# Patient Record
Sex: FEMALE | Race: WHITE | Employment: OTHER | URBAN - NONMETROPOLITAN AREA
[De-identification: names, ages, dates, MRNs, and addresses within clinical notes are randomized per-mention and may not be internally consistent; named-entity substitution may affect disease eponyms.]

---

## 2017-05-02 LAB
AVERAGE GLUCOSE: NORMAL
HBA1C MFR BLD: 7 %
TSH SERPL DL<=0.05 MIU/L-ACNC: 8.28 UIU/ML

## 2017-06-26 LAB — TSH SERPL DL<=0.05 MIU/L-ACNC: 0.08 UIU/ML

## 2018-03-23 LAB
AVERAGE GLUCOSE: NORMAL
HBA1C MFR BLD: 7.1 %

## 2018-05-24 LAB
AVERAGE GLUCOSE: NORMAL
HBA1C MFR BLD: 6.9 %

## 2018-11-24 LAB
ALBUMIN SERPL-MCNC: ABNORMAL G/DL
ALP BLD-CCNC: ABNORMAL U/L
ALT SERPL-CCNC: ABNORMAL U/L
ANION GAP SERPL CALCULATED.3IONS-SCNC: 12 MMOL/L
AST SERPL-CCNC: ABNORMAL U/L
BASOPHILS ABSOLUTE: 0 /ΜL
BASOPHILS RELATIVE PERCENT: 0.5 %
BILIRUB SERPL-MCNC: ABNORMAL MG/DL (ref 0.1–1.4)
BUN BLDV-MCNC: 17 MG/DL
CALCIUM SERPL-MCNC: 8.8 MG/DL
CHLORIDE BLD-SCNC: 104 MMOL/L
CO2: 24 MMOL/L
CREAT SERPL-MCNC: 0.67 MG/DL
EOSINOPHILS ABSOLUTE: 0.5 /ΜL
EOSINOPHILS RELATIVE PERCENT: 7.2 %
GFR CALCULATED: >60
GLUCOSE BLD-MCNC: 147 MG/DL
HCT VFR BLD CALC: 40 % (ref 36–46)
HEMOGLOBIN: 13.6 G/DL (ref 12–16)
LYMPHOCYTES ABSOLUTE: 2.6 /ΜL
LYMPHOCYTES RELATIVE PERCENT: 35.1 %
MCH RBC QN AUTO: 32 PG
MCHC RBC AUTO-ENTMCNC: 33.9 G/DL
MCV RBC AUTO: 94.4 FL
MONOCYTES ABSOLUTE: 0.5 /ΜL
MONOCYTES RELATIVE PERCENT: 7.2 %
NEUTROPHILS ABSOLUTE: 3.7 /ΜL
NEUTROPHILS RELATIVE PERCENT: 50 %
PDW BLD-RTO: 12.6 %
PLATELET # BLD: 238 K/ΜL
PMV BLD AUTO: 8.1 FL
POTASSIUM SERPL-SCNC: 3.7 MMOL/L
RBC # BLD: 4.24 10^6/ΜL
SODIUM BLD-SCNC: 140 MMOL/L
TOTAL PROTEIN: ABNORMAL
WBC # BLD: 7.4 10^3/ML

## 2019-01-07 ENCOUNTER — OFFICE VISIT (OUTPATIENT)
Dept: FAMILY MEDICINE CLINIC | Age: 84
End: 2019-01-07
Payer: COMMERCIAL

## 2019-01-07 VITALS
DIASTOLIC BLOOD PRESSURE: 60 MMHG | TEMPERATURE: 98.4 F | OXYGEN SATURATION: 96 % | HEART RATE: 87 BPM | RESPIRATION RATE: 16 BRPM | SYSTOLIC BLOOD PRESSURE: 136 MMHG | WEIGHT: 135 LBS

## 2019-01-07 DIAGNOSIS — Z91.81 AT HIGH RISK FOR FALLS: Primary | ICD-10-CM

## 2019-01-07 DIAGNOSIS — S03.40XA SPRAIN OF TEMPOROMANDIBULAR JOINT, INITIAL ENCOUNTER: ICD-10-CM

## 2019-01-07 PROCEDURE — 99203 OFFICE O/P NEW LOW 30 MIN: CPT | Performed by: FAMILY MEDICINE

## 2019-01-07 RX ORDER — LOSARTAN POTASSIUM 25 MG/1
TABLET ORAL
COMMUNITY
End: 2019-02-11 | Stop reason: SDUPTHER

## 2019-01-07 RX ORDER — PAROXETINE HYDROCHLORIDE 40 MG/1
TABLET, FILM COATED ORAL
COMMUNITY
Start: 2018-10-06 | End: 2019-01-31 | Stop reason: SDUPTHER

## 2019-01-07 RX ORDER — LEVOTHYROXINE SODIUM 137 UG/1
137 TABLET ORAL DAILY
COMMUNITY
End: 2019-02-11 | Stop reason: SDUPTHER

## 2019-01-07 ASSESSMENT — PATIENT HEALTH QUESTIONNAIRE - PHQ9
2. FEELING DOWN, DEPRESSED OR HOPELESS: 0
SUM OF ALL RESPONSES TO PHQ9 QUESTIONS 1 & 2: 0
SUM OF ALL RESPONSES TO PHQ QUESTIONS 1-9: 0
1. LITTLE INTEREST OR PLEASURE IN DOING THINGS: 0
SUM OF ALL RESPONSES TO PHQ QUESTIONS 1-9: 0

## 2019-01-07 ASSESSMENT — ENCOUNTER SYMPTOMS
RHINORRHEA: 0
ABDOMINAL PAIN: 0
COUGH: 1
DIARRHEA: 0
VOMITING: 0
SORE THROAT: 0

## 2019-01-30 ENCOUNTER — TELEPHONE (OUTPATIENT)
Dept: FAMILY MEDICINE CLINIC | Age: 84
End: 2019-01-30

## 2019-01-30 DIAGNOSIS — F32.A DEPRESSION, UNSPECIFIED DEPRESSION TYPE: Primary | ICD-10-CM

## 2019-01-31 RX ORDER — PAROXETINE HYDROCHLORIDE 40 MG/1
40 TABLET, FILM COATED ORAL EVERY MORNING
Qty: 30 TABLET | Refills: 0 | Status: CANCELLED | OUTPATIENT
Start: 2019-01-31

## 2019-01-31 RX ORDER — PAROXETINE HYDROCHLORIDE 40 MG/1
40 TABLET, FILM COATED ORAL EVERY MORNING
Qty: 30 TABLET | Refills: 0 | Status: SHIPPED | OUTPATIENT
Start: 2019-01-31 | End: 2019-02-11 | Stop reason: SDUPTHER

## 2019-02-11 ENCOUNTER — OFFICE VISIT (OUTPATIENT)
Dept: FAMILY MEDICINE CLINIC | Age: 84
End: 2019-02-11
Payer: COMMERCIAL

## 2019-02-11 VITALS
RESPIRATION RATE: 18 BRPM | HEART RATE: 64 BPM | BODY MASS INDEX: 24.45 KG/M2 | HEIGHT: 63 IN | OXYGEN SATURATION: 96 % | SYSTOLIC BLOOD PRESSURE: 110 MMHG | DIASTOLIC BLOOD PRESSURE: 62 MMHG | WEIGHT: 138 LBS

## 2019-02-11 DIAGNOSIS — N39.3 STRESS INCONTINENCE: ICD-10-CM

## 2019-02-11 DIAGNOSIS — H91.90 HEARING LOSS, UNSPECIFIED HEARING LOSS TYPE, UNSPECIFIED LATERALITY: ICD-10-CM

## 2019-02-11 DIAGNOSIS — K58.9 IRRITABLE BOWEL SYNDROME, UNSPECIFIED TYPE: ICD-10-CM

## 2019-02-11 DIAGNOSIS — R30.0 DYSURIA: Primary | ICD-10-CM

## 2019-02-11 DIAGNOSIS — E11.9 TYPE 2 DIABETES MELLITUS WITHOUT COMPLICATION, WITHOUT LONG-TERM CURRENT USE OF INSULIN (HCC): ICD-10-CM

## 2019-02-11 DIAGNOSIS — J45.20 MILD INTERMITTENT ASTHMA WITHOUT COMPLICATION: ICD-10-CM

## 2019-02-11 DIAGNOSIS — F32.A DEPRESSION, UNSPECIFIED DEPRESSION TYPE: ICD-10-CM

## 2019-02-11 DIAGNOSIS — E03.9 HYPOTHYROIDISM, UNSPECIFIED TYPE: ICD-10-CM

## 2019-02-11 DIAGNOSIS — N39.0 FREQUENT UTI: ICD-10-CM

## 2019-02-11 DIAGNOSIS — F41.9 ANXIETY: ICD-10-CM

## 2019-02-11 DIAGNOSIS — R29.6 RECURRENT FALLS: ICD-10-CM

## 2019-02-11 DIAGNOSIS — I10 ESSENTIAL HYPERTENSION: ICD-10-CM

## 2019-02-11 LAB
BILIRUBIN, POC: NEGATIVE
BLOOD URINE, POC: NORMAL
CLARITY, POC: NORMAL
COLOR, POC: YELLOW
CREATININE URINE POCT: 100
GLUCOSE URINE, POC: NEGATIVE
KETONES, POC: NEGATIVE
LEUKOCYTE EST, POC: NORMAL
MICROALBUMIN/CREAT 24H UR: 30 MG/G{CREAT}
MICROALBUMIN/CREAT UR-RTO: <30
NITRITE, POC: POSITIVE
PH, POC: 6.5
PROTEIN, POC: NEGATIVE
SPECIFIC GRAVITY, POC: 1.01
UROBILINOGEN, POC: 0.2

## 2019-02-11 PROCEDURE — 99214 OFFICE O/P EST MOD 30 MIN: CPT | Performed by: FAMILY MEDICINE

## 2019-02-11 PROCEDURE — 81003 URINALYSIS AUTO W/O SCOPE: CPT | Performed by: FAMILY MEDICINE

## 2019-02-11 PROCEDURE — 82044 UR ALBUMIN SEMIQUANTITATIVE: CPT | Performed by: FAMILY MEDICINE

## 2019-02-11 RX ORDER — ALBUTEROL SULFATE 90 UG/1
2 AEROSOL, METERED RESPIRATORY (INHALATION) EVERY 6 HOURS PRN
COMMUNITY
End: 2019-02-11 | Stop reason: SDUPTHER

## 2019-02-11 RX ORDER — PAROXETINE HYDROCHLORIDE 40 MG/1
40 TABLET, FILM COATED ORAL EVERY MORNING
Qty: 90 TABLET | Refills: 3 | Status: SHIPPED | OUTPATIENT
Start: 2019-02-11 | End: 2019-04-04 | Stop reason: SDUPTHER

## 2019-02-11 RX ORDER — CEPHALEXIN 500 MG/1
500 CAPSULE ORAL 2 TIMES DAILY
Qty: 14 CAPSULE | Refills: 0 | Status: SHIPPED | OUTPATIENT
Start: 2019-02-11 | End: 2019-05-02

## 2019-02-11 RX ORDER — LOSARTAN POTASSIUM 25 MG/1
12.5 TABLET ORAL DAILY
Qty: 45 TABLET | Refills: 3 | Status: SHIPPED | OUTPATIENT
Start: 2019-02-11 | End: 2019-05-02 | Stop reason: SDUPTHER

## 2019-02-11 RX ORDER — ALBUTEROL SULFATE 90 UG/1
2 AEROSOL, METERED RESPIRATORY (INHALATION) EVERY 6 HOURS PRN
Qty: 1 INHALER | Refills: 3 | Status: SHIPPED | OUTPATIENT
Start: 2019-02-11 | End: 2020-02-27 | Stop reason: SDUPTHER

## 2019-02-11 RX ORDER — LEVOTHYROXINE SODIUM 137 UG/1
137 TABLET ORAL DAILY
Qty: 90 TABLET | Refills: 1 | Status: SHIPPED | OUTPATIENT
Start: 2019-02-11 | End: 2019-07-18 | Stop reason: SDUPTHER

## 2019-02-11 ASSESSMENT — ENCOUNTER SYMPTOMS
COUGH: 1
SHORTNESS OF BREATH: 0
EYE DISCHARGE: 0
VOMITING: 0
CONSTIPATION: 0
BACK PAIN: 0
DIARRHEA: 0
RHINORRHEA: 0
NAUSEA: 0
ABDOMINAL PAIN: 0
BLOOD IN STOOL: 0
EYE REDNESS: 0
SORE THROAT: 0

## 2019-02-13 LAB
ORGANISM: ABNORMAL
URINE CULTURE, ROUTINE: ABNORMAL

## 2019-02-22 DIAGNOSIS — F32.A DEPRESSION, UNSPECIFIED DEPRESSION TYPE: ICD-10-CM

## 2019-02-22 RX ORDER — PAROXETINE HYDROCHLORIDE 40 MG/1
40 TABLET, FILM COATED ORAL EVERY MORNING
Qty: 90 TABLET | Refills: 3 | Status: CANCELLED | OUTPATIENT
Start: 2019-02-22

## 2019-02-25 DIAGNOSIS — F32.A DEPRESSION, UNSPECIFIED DEPRESSION TYPE: ICD-10-CM

## 2019-02-26 RX ORDER — PAROXETINE HYDROCHLORIDE 40 MG/1
40 TABLET, FILM COATED ORAL EVERY MORNING
Qty: 90 TABLET | Refills: 3 | OUTPATIENT
Start: 2019-02-26

## 2019-02-27 DIAGNOSIS — F32.A DEPRESSION, UNSPECIFIED DEPRESSION TYPE: ICD-10-CM

## 2019-02-27 RX ORDER — PAROXETINE HYDROCHLORIDE 40 MG/1
TABLET, FILM COATED ORAL
Qty: 30 TABLET | Refills: 0 | OUTPATIENT
Start: 2019-02-27

## 2019-03-01 ENCOUNTER — TELEPHONE (OUTPATIENT)
Dept: FAMILY MEDICINE CLINIC | Age: 84
End: 2019-03-01

## 2019-03-06 DIAGNOSIS — F32.A DEPRESSION, UNSPECIFIED DEPRESSION TYPE: ICD-10-CM

## 2019-03-07 RX ORDER — PAROXETINE HYDROCHLORIDE 40 MG/1
40 TABLET, FILM COATED ORAL EVERY MORNING
Qty: 90 TABLET | Refills: 3 | OUTPATIENT
Start: 2019-03-07

## 2019-03-08 DIAGNOSIS — F32.A DEPRESSION, UNSPECIFIED DEPRESSION TYPE: ICD-10-CM

## 2019-03-08 RX ORDER — PAROXETINE HYDROCHLORIDE 40 MG/1
40 TABLET, FILM COATED ORAL EVERY MORNING
Qty: 90 TABLET | Refills: 3 | OUTPATIENT
Start: 2019-03-08

## 2019-03-27 ENCOUNTER — TELEPHONE (OUTPATIENT)
Dept: FAMILY MEDICINE CLINIC | Age: 84
End: 2019-03-27

## 2019-04-03 DIAGNOSIS — F32.A DEPRESSION, UNSPECIFIED DEPRESSION TYPE: ICD-10-CM

## 2019-04-04 RX ORDER — PAROXETINE HYDROCHLORIDE 40 MG/1
40 TABLET, FILM COATED ORAL EVERY MORNING
Qty: 90 TABLET | Refills: 3 | Status: SHIPPED | OUTPATIENT
Start: 2019-04-04 | End: 2019-11-07 | Stop reason: SDUPTHER

## 2019-04-04 NOTE — TELEPHONE ENCOUNTER
Rx was already sent in to the pharmacy 2/11 for 90 day supply with refills. Please confirm that they have script and that new script is not needed.

## 2019-04-05 NOTE — TELEPHONE ENCOUNTER
Spoke with Melita Mccullough at [facility]. They do not need another prescription. They had the patient in their system twice. Patient does not need a refill.

## 2019-04-16 DIAGNOSIS — F32.A DEPRESSION, UNSPECIFIED DEPRESSION TYPE: ICD-10-CM

## 2019-04-22 RX ORDER — PAROXETINE HYDROCHLORIDE 40 MG/1
40 TABLET, FILM COATED ORAL EVERY MORNING
Qty: 90 TABLET | Refills: 3 | OUTPATIENT
Start: 2019-04-22

## 2019-05-02 ENCOUNTER — TELEPHONE (OUTPATIENT)
Dept: FAMILY MEDICINE CLINIC | Age: 84
End: 2019-05-02

## 2019-05-02 ENCOUNTER — OFFICE VISIT (OUTPATIENT)
Dept: FAMILY MEDICINE CLINIC | Age: 84
End: 2019-05-02
Payer: COMMERCIAL

## 2019-05-02 VITALS
RESPIRATION RATE: 16 BRPM | HEIGHT: 63 IN | SYSTOLIC BLOOD PRESSURE: 110 MMHG | OXYGEN SATURATION: 98 % | BODY MASS INDEX: 24.41 KG/M2 | DIASTOLIC BLOOD PRESSURE: 70 MMHG | HEART RATE: 77 BPM | WEIGHT: 137.8 LBS

## 2019-05-02 DIAGNOSIS — K58.9 IRRITABLE BOWEL SYNDROME, UNSPECIFIED TYPE: ICD-10-CM

## 2019-05-02 DIAGNOSIS — F41.9 ANXIETY: ICD-10-CM

## 2019-05-02 DIAGNOSIS — E11.9 TYPE 2 DIABETES MELLITUS WITHOUT COMPLICATION, WITHOUT LONG-TERM CURRENT USE OF INSULIN (HCC): Primary | ICD-10-CM

## 2019-05-02 DIAGNOSIS — F32.A DEPRESSION, UNSPECIFIED DEPRESSION TYPE: ICD-10-CM

## 2019-05-02 DIAGNOSIS — J45.20 MILD INTERMITTENT ASTHMA WITHOUT COMPLICATION: ICD-10-CM

## 2019-05-02 DIAGNOSIS — N39.3 STRESS INCONTINENCE: ICD-10-CM

## 2019-05-02 DIAGNOSIS — R05.9 COUGH: ICD-10-CM

## 2019-05-02 DIAGNOSIS — E03.9 HYPOTHYROIDISM, UNSPECIFIED TYPE: ICD-10-CM

## 2019-05-02 DIAGNOSIS — R29.6 RECURRENT FALLS: ICD-10-CM

## 2019-05-02 PROBLEM — I10 ESSENTIAL HYPERTENSION: Status: RESOLVED | Noted: 2019-02-11 | Resolved: 2019-05-02

## 2019-05-02 LAB — HBA1C MFR BLD: 6.8 %

## 2019-05-02 PROCEDURE — 99214 OFFICE O/P EST MOD 30 MIN: CPT | Performed by: FAMILY MEDICINE

## 2019-05-02 PROCEDURE — 83036 HEMOGLOBIN GLYCOSYLATED A1C: CPT | Performed by: FAMILY MEDICINE

## 2019-05-02 RX ORDER — LOSARTAN POTASSIUM 25 MG/1
12.5 TABLET ORAL DAILY
Qty: 45 TABLET | Refills: 3 | Status: SHIPPED | OUTPATIENT
Start: 2019-05-02 | End: 2019-05-02

## 2019-05-02 ASSESSMENT — ENCOUNTER SYMPTOMS
EYE DISCHARGE: 0
DIARRHEA: 0
CONSTIPATION: 0
ABDOMINAL PAIN: 0
RHINORRHEA: 0
VOMITING: 0
BLOOD IN STOOL: 0
NAUSEA: 0
COUGH: 1
EYE REDNESS: 0
SHORTNESS OF BREATH: 0
SORE THROAT: 0
BACK PAIN: 0

## 2019-05-02 NOTE — PROGRESS NOTES
[address] [address] [place] [address]  Dept: [phone]  Dept Fax: [phone]  Loc: [phone]      Tae Mckeon is a 80 y.o. female who presents todayfor her medical conditions/complaints as noted below. Tae Mckeon is c/o of Discuss Medications (losartan recall? )      HPI:     HPI     Here with questions about medication. Got a letter about it in the mail suggesting she speak with doctor. Feeling well. No history of hypertension. Losartan was started for renal protection with diabetes. Feels well. No side effects noted. Finished medication for UTI. These symptoms are gone. Daughter is Lou Valera. IBS- well managed and eats many small meals throughout the day, regular bowel movements daily. Hypothyroid- no issues with medication, weight stable. T2DM- has been 10 years since she's taken any medication for glucose control, used to take metformin, was diagnosed 25 years ago. Reports A1C in December is at goal.      Asthma- might use albuterol inhaler 1 to 2 times a month depending on when she visits one of her daughters due to allergies to cat    Anxiety- well- controlled on paxil for 25 years, mood well, no SI/HI/SH    Recurrent UTI- hx of mesh placement for stress incontinence, procedure was 15 years ago; doing well without incontinence. No new falls. Hearing not too great and hearing aids not working too well- seeing Khang Aguilera in [facility]     Reports up to date on vaccinations. Las labs were done in November or December; requesting records. Patient Active Problem List   Diagnosis    Irritable bowel    Hypothyroid    Frequent UTI    Diabetes ([facility] [address]    Asthma    Anxiety    Stress incontinence    Recurrent falls    Hearing loss       The patient is allergic to pcn [penicillins] and sulfa antibiotics.     Past Medical History  Annetta Hernandez has a past medical history of Anxiety, Asthma, Diabetes ([address] Frequent UTI, Hearing loss, Hypothyroid, Irritable bowel, Recurrent falls, Stress incontinence, and Tarsal coalitions. Past SurgicalHistory  The patient  has a past surgical history that includes Hysterectomy, total abdominal; Gallbladder surgery; Hand surgery; Inner ear surgery; Tonsillectomy and adenoidectomy; Cataract removal; and Urethra surgery. Family History  This patient's family history includes Atrial Fibrillation in her mother; Breast Cancer in her mother. Social History  Stefanie  reports that she has never smoked. She has never used smokeless tobacco.    Medications    Current Outpatient Medications:     PARoxetine (PAXIL) 40 MG tablet, Take 1 tablet by mouth every morning, Disp: 90 tablet, Rfl: 3    Multiple Vitamins-Minerals (EYE VITAMINS) CAPS, Take by mouth, Disp: , Rfl:     Multiple Vitamins-Minerals (WOMENS MULTIVITAMIN) TABS, Take by mouth, Disp: , Rfl:     Calcium Carb-Cholecalciferol (CALCIUM/VITAMIN D) 600-400 MG-UNIT TABS, Take by mouth, Disp: , Rfl:     Cranberry 1000 MG CAPS, Take by mouth, Disp: , Rfl:     albuterol sulfate HFA (PROAIR HFA) 108 (90 Base) MCG/ACT inhaler, Inhale 2 puffs into the lungs every 6 hours as needed for Wheezing, Disp: 1 Inhaler, Rfl: 3    levothyroxine (SYNTHROID) 137 MCG tablet, Take 1 tablet by mouth Daily, Disp: 90 tablet, Rfl: 1    Subjective:      Review of Systems   Constitutional: Negative for chills, diaphoresis, fatigue, fever and unexpected weight change. HENT: Positive for hearing loss. Negative for congestion, ear discharge, ear pain, rhinorrhea and sore throat. Eyes: Negative for discharge, redness and visual disturbance. Respiratory: Positive for cough (occasional dry cough worse with swallowing; denies trouble swallowing). Negative for shortness of breath. Cardiovascular: Negative for chest pain and palpitations.    Gastrointestinal: Negative for abdominal pain, blood in stool, constipation, diarrhea, nausea negative  Spider veins. No sign of thrombophlebitis. Sensory exam:  Monofilament sensation: normal  (minimum of 5 random plantar locations tested, avoiding callused areas - > 1 area with absence of sensation is + for neuropathy)    Plus at least one of the following:  Pulses: normal,   Pinprick: N/A  Proprioception: N/A  Vibration (128 Hz): N/A      No results found for: WBC, HGB, HCT, PLT, CHOL, TRIG, HDL, LDLDIRECT, ALT, AST, NA, K, CL, CREATININE, BUN, CO2, TSH, PSA, INR, GLUF, LABA1C, LABMICR    Assessment/Plan:   1. Type 2 diabetes mellitus without complication, without long-term current use of insulin (HCC)  At goal for age. Will D/C losartan since no microalbuminuria no hypertension.   - POCT glycosylated hemoglobin (Hb A1C)  -  DIABETES FOOT EXAM    2. Cough  May be related to losartan; will trial D/C. Lungs clear. 3. Hypothyroidism, unspecified type  Requesting record of labs. Continue current regimen. 4. Irritable bowel syndrome, unspecified type  Stable. 5. Mild intermittent asthma without complication  Well controlled. 6. Anxiety  Well controlled. 7. Stress incontinence  Stable. 8. Recurrent falls  None further. Will follow. 9. Depression, unspecified depression type  Well controlled. Return in about 3 months (around 8/2/2019) for follow-up diabetes . Orders Placed   Orders Placed This Encounter   Procedures    POCT glycosylated hemoglobin (Hb A1C)     DIABETES FOOT EXAM       Prescriptions given/sent  Orders Placed This Encounter   Medications    DISCONTD: losartan (COZAAR) 25 MG tablet     Sig: Take 0.5 tablets by mouth daily Take 1/2 tablet by mouth once daily     Dispense:  45 tablet     Refill:  3       Patient given educational materials - see patient instructions. Discussed use, benefit, and side effects of prescribed medications. All patientquestions answered. Pt voiced understanding. Reviewed health maintenance.         Electronically signed by Saeid Quintana MD on 5/2/2019 at 9:16 AM

## 2019-05-08 NOTE — TELEPHONE ENCOUNTER
Dr. Latesha Jacobson office is faxing the last labs to us looks like they are from 11/24/18.
Emily Rothman where is  located at??
Pharmacy notified.
Declines

## 2019-05-22 ENCOUNTER — TELEPHONE (OUTPATIENT)
Dept: FAMILY MEDICINE CLINIC | Age: 84
End: 2019-05-22

## 2019-05-22 NOTE — TELEPHONE ENCOUNTER
Tae Mckeon called requesting a refill on the following medications:  Requested Prescriptions     Pending Prescriptions Disp Refills    levothyroxine (SYNTHROID) 137 MCG tablet 90 tablet 1     Sig: Take 1 tablet by mouth Daily     Pharmacy verified:  [facility]  . pv      Date of last visit: 5/2/2019  Date of next visit (if applicable): 5/37/6592

## 2019-05-23 RX ORDER — LEVOTHYROXINE SODIUM 137 UG/1
137 TABLET ORAL DAILY
Qty: 90 TABLET | Refills: 1 | Status: CANCELLED | OUTPATIENT
Start: 2019-05-23

## 2019-05-24 NOTE — TELEPHONE ENCOUNTER
Pharmacy does not need a refill for this medication. It is ready for that patient to . Inge De Jesus at the pharmacy deleted the second profile. She said it looks like it only had one prescription that was filled in January. Patient now only has one profile at pharmacy.

## 2019-07-18 ENCOUNTER — OFFICE VISIT (OUTPATIENT)
Dept: FAMILY MEDICINE CLINIC | Age: 84
End: 2019-07-18
Payer: COMMERCIAL

## 2019-07-18 VITALS
HEIGHT: 63 IN | BODY MASS INDEX: 23.88 KG/M2 | DIASTOLIC BLOOD PRESSURE: 86 MMHG | HEART RATE: 73 BPM | WEIGHT: 134.8 LBS | RESPIRATION RATE: 16 BRPM | SYSTOLIC BLOOD PRESSURE: 132 MMHG | OXYGEN SATURATION: 96 %

## 2019-07-18 DIAGNOSIS — Z00.00 ROUTINE GENERAL MEDICAL EXAMINATION AT A HEALTH CARE FACILITY: Primary | ICD-10-CM

## 2019-07-18 DIAGNOSIS — N39.3 STRESS INCONTINENCE: ICD-10-CM

## 2019-07-18 DIAGNOSIS — J45.20 MILD INTERMITTENT ASTHMA WITHOUT COMPLICATION: ICD-10-CM

## 2019-07-18 DIAGNOSIS — Z71.89 ACP (ADVANCE CARE PLANNING): ICD-10-CM

## 2019-07-18 DIAGNOSIS — E03.9 HYPOTHYROIDISM, UNSPECIFIED TYPE: ICD-10-CM

## 2019-07-18 DIAGNOSIS — K58.9 IRRITABLE BOWEL SYNDROME, UNSPECIFIED TYPE: ICD-10-CM

## 2019-07-18 DIAGNOSIS — H91.90 HEARING LOSS, UNSPECIFIED HEARING LOSS TYPE, UNSPECIFIED LATERALITY: ICD-10-CM

## 2019-07-18 DIAGNOSIS — F32.A DEPRESSION, UNSPECIFIED DEPRESSION TYPE: ICD-10-CM

## 2019-07-18 DIAGNOSIS — I10 ESSENTIAL HYPERTENSION: ICD-10-CM

## 2019-07-18 DIAGNOSIS — F41.9 ANXIETY: ICD-10-CM

## 2019-07-18 DIAGNOSIS — E11.9 TYPE 2 DIABETES MELLITUS WITHOUT COMPLICATION, WITHOUT LONG-TERM CURRENT USE OF INSULIN (HCC): ICD-10-CM

## 2019-07-18 PROCEDURE — G0439 PPPS, SUBSEQ VISIT: HCPCS | Performed by: FAMILY MEDICINE

## 2019-07-18 PROCEDURE — 99497 ADVNCD CARE PLAN 30 MIN: CPT | Performed by: FAMILY MEDICINE

## 2019-07-18 RX ORDER — LEVOTHYROXINE SODIUM 137 UG/1
137 TABLET ORAL DAILY
Qty: 90 TABLET | Refills: 1 | Status: SHIPPED | OUTPATIENT
Start: 2019-07-18 | End: 2019-11-07 | Stop reason: SDUPTHER

## 2019-07-18 ASSESSMENT — ENCOUNTER SYMPTOMS
CONSTIPATION: 0
SORE THROAT: 0
EYE REDNESS: 0
ABDOMINAL PAIN: 0
VOMITING: 0
BACK PAIN: 0
EYE DISCHARGE: 0
DIARRHEA: 0
RHINORRHEA: 0
BLOOD IN STOOL: 0
NAUSEA: 0
SHORTNESS OF BREATH: 0
COUGH: 1

## 2019-07-18 ASSESSMENT — LIFESTYLE VARIABLES
HOW OFTEN DURING THE LAST YEAR HAVE YOU FOUND THAT YOU WERE NOT ABLE TO STOP DRINKING ONCE YOU HAD STARTED: 0
HOW MANY STANDARD DRINKS CONTAINING ALCOHOL DO YOU HAVE ON A TYPICAL DAY: 0
HAVE YOU OR SOMEONE ELSE BEEN INJURED AS A RESULT OF YOUR DRINKING: 0
HOW OFTEN DURING THE LAST YEAR HAVE YOU BEEN UNABLE TO REMEMBER WHAT HAPPENED THE NIGHT BEFORE BECAUSE YOU HAD BEEN DRINKING: 0
AUDIT TOTAL SCORE: 1
HOW OFTEN DURING THE LAST YEAR HAVE YOU NEEDED AN ALCOHOLIC DRINK FIRST THING IN THE MORNING TO GET YOURSELF GOING AFTER A NIGHT OF HEAVY DRINKING: 0
HOW OFTEN DO YOU HAVE SIX OR MORE DRINKS ON ONE OCCASION: 0
HOW OFTEN DURING THE LAST YEAR HAVE YOU FAILED TO DO WHAT WAS NORMALLY EXPECTED FROM YOU BECAUSE OF DRINKING: 0
HAS A RELATIVE, FRIEND, DOCTOR, OR ANOTHER HEALTH PROFESSIONAL EXPRESSED CONCERN ABOUT YOUR DRINKING OR SUGGESTED YOU CUT DOWN: 0
HOW OFTEN DURING THE LAST YEAR HAVE YOU HAD A FEELING OF GUILT OR REMORSE AFTER DRINKING: 0
HOW OFTEN DO YOU HAVE A DRINK CONTAINING ALCOHOL: 1
AUDIT-C TOTAL SCORE: 1

## 2019-07-18 ASSESSMENT — PATIENT HEALTH QUESTIONNAIRE - PHQ9
SUM OF ALL RESPONSES TO PHQ QUESTIONS 1-9: 1
SUM OF ALL RESPONSES TO PHQ QUESTIONS 1-9: 1

## 2019-07-18 NOTE — PROGRESS NOTES
40 MG tablet Take 1 tablet by mouth every morning Yes Selam Huntley MD   Multiple Vitamins-Minerals (EYE VITAMINS) CAPS Take by mouth Yes Historical Provider, MD   Multiple Vitamins-Minerals (WOMENS MULTIVITAMIN) TABS Take by mouth Yes Historical Provider, MD   Calcium Carb-Cholecalciferol (CALCIUM/VITAMIN D) 600-400 MG-UNIT TABS Take by mouth Yes Historical Provider, MD   Cranberry 1000 MG CAPS Take by mouth Yes Historical Provider, MD   albuterol sulfate HFA (PROAIR HFA) 108 (90 Base) MCG/ACT inhaler Inhale 2 puffs into the lungs every 6 hours as needed for Wheezing Yes Selam Huntley MD   levothyroxine (SYNTHROID) 137 MCG tablet Take 1 tablet by mouth Daily Yes Selam Huntley MD     Past Medical History:   Diagnosis Date    Anxiety     Asthma     Diabetes ([address]     type 2    Frequent UTI     Hearing loss     follows with Omero Ferrer    Hypothyroid     Irritable bowel     Recurrent falls     Stress incontinence     Tarsal coalitions     bilateral feet     Past Surgical History:   Procedure Laterality Date    CATARACT REMOVAL      bilateral    GALLBLADDER SURGERY      HAND SURGERY      froze left wrist to help with arthritis x 2    HYSTERECTOMY, TOTAL ABDOMINAL      INNER EAR SURGERY      uncertain nature; remote    TONSILLECTOMY AND ADENOIDECTOMY      URETHRA SURGERY      15 years ago, mesh placed for stress incontinence     Family History   Problem Relation Age of Onset    Breast Cancer Mother     Atrial Fibrillation Mother        CareTeam (Including outside providers/suppliers regularly involved in providing care):   Patient Care Team:  Selam Huntley MD as PCP - General (Family Medicine)  Selam Huntley MD as PCP - REHABILITATION [facility] Empaneled Provider    Wt Readings from Last 3 Encounters:   07/18/19 134 lb 12.8 oz (61.1 kg)   05/02/19 137 lb 12.8 oz (62.5 kg)   02/11/19 138 lb (62.6 kg)     Vitals:    07/18/19 0958   BP: 132/86   Site: Left Upper Arm   Pulse: 73   Resp: 16   SpO2: 96%   Weight: Metabolic Panel; Future  - CBC With Auto Differential; Future  - Lipid, Fasting; Future    3. Hypothyroidism, unspecified type  Will check labs. - levothyroxine (SYNTHROID) 137 MCG tablet; Take 1 tablet by mouth Daily  Dispense: 90 tablet; Refill: 1    4. Irritable bowel syndrome, unspecified type  Stable. 5. Mild intermittent asthma without complication  Stable. 6. Anxiety  Stable. 7. Stress incontinence  Stable. 8. Depression, unspecified depression type  Stable. 9. Hearing loss, unspecified hearing loss type, unspecified laterality  Managing okay. 10. Essential hypertension  At goal off medication. Advanced care planning performed today. 10 minute discussion. Vandana Buenrostro would make decisions. Wishes to be DNR. Will sign paperwork when facility sends in. Back pain suspect arthritis; reviewed prior imaging. Patient to trial cushion that was effective in the past.    No new symptoms or red flags. Advance Care Planning   Advanced Care Planning: Discussed the patients choices for care and treatment in case of a health event that adversely affects decision-making abilities. Also discussed the patients long-term treatment options. Reviewed with the patient the [address] [place] [address] Will Declaration forms  Reviewed the process of designating a competent adult as an Agent (or -in-fact) that could take make health care decisions for the patient if incompetent. Patient was asked to complete the declaration forms, either acknowledge the forms by a public notary or an eligible witness and provide a signed copy to the practice office.   Time spent (minutes): 10

## 2019-07-18 NOTE — PATIENT INSTRUCTIONS
off with a towel, or swimming. · Always wear a seat belt when traveling in a car. Always wear a helmet when riding a bicycle or motorcycle.

## 2019-09-05 ENCOUNTER — OFFICE VISIT (OUTPATIENT)
Dept: FAMILY MEDICINE CLINIC | Age: 84
End: 2019-09-05
Payer: COMMERCIAL

## 2019-09-05 VITALS
BODY MASS INDEX: 24.62 KG/M2 | SYSTOLIC BLOOD PRESSURE: 130 MMHG | DIASTOLIC BLOOD PRESSURE: 66 MMHG | HEART RATE: 88 BPM | WEIGHT: 139 LBS | OXYGEN SATURATION: 96 % | RESPIRATION RATE: 18 BRPM

## 2019-09-05 DIAGNOSIS — Q74.2 FOOT ANOMALY, CONGENITAL: ICD-10-CM

## 2019-09-05 DIAGNOSIS — M25.552 LEFT HIP PAIN: ICD-10-CM

## 2019-09-05 DIAGNOSIS — G89.29 CHRONIC LEFT-SIDED LOW BACK PAIN WITH LEFT-SIDED SCIATICA: Primary | ICD-10-CM

## 2019-09-05 DIAGNOSIS — M54.42 CHRONIC LEFT-SIDED LOW BACK PAIN WITH LEFT-SIDED SCIATICA: Primary | ICD-10-CM

## 2019-09-05 DIAGNOSIS — M25.512 CHRONIC LEFT SHOULDER PAIN: ICD-10-CM

## 2019-09-05 DIAGNOSIS — G89.29 CHRONIC LEFT SHOULDER PAIN: ICD-10-CM

## 2019-09-05 PROCEDURE — 99214 OFFICE O/P EST MOD 30 MIN: CPT | Performed by: FAMILY MEDICINE

## 2019-09-05 ASSESSMENT — ENCOUNTER SYMPTOMS
RHINORRHEA: 0
DIARRHEA: 0
VOMITING: 0
EYE DISCHARGE: 0
CONSTIPATION: 0
NAUSEA: 0
SHORTNESS OF BREATH: 0
BLOOD IN STOOL: 0
BACK PAIN: 1
EYE REDNESS: 0
COUGH: 1
SORE THROAT: 0
ABDOMINAL PAIN: 0

## 2019-09-05 NOTE — PATIENT INSTRUCTIONS
breaks from sitting. Get up and walk around, or lie in a comfortable position. · Try using a heating pad on a low or medium setting for 15 to 20 minutes every 2 or 3 hours. Try a warm shower in place of one session with the heating pad. · You can also try an ice pack for 10 to 15 minutes every 2 to 3 hours. Put a thin cloth between the ice pack and your skin. · Take pain medicines exactly as directed. ? If the doctor gave you a prescription medicine for pain, take it as prescribed. ? If you are not taking a prescription pain medicine, ask your doctor if you can take an over-the-counter medicine. · Take short walks several times a day. You can start with 5 to 10 minutes, 3 or 4 times a day, and work up to longer walks. Walk on level surfaces and avoid hills and stairs until your back is better. · Return to work and other activities as soon as you can. Continued rest without activity is usually not good for your back. · To prevent future back pain, do exercises to stretch and strengthen your back and stomach. Learn how to use good posture, safe lifting techniques, and proper body mechanics. When should you call for help? Call your doctor now or seek immediate medical care if:    · You have new or worsening numbness in your legs.     · You have new or worsening weakness in your legs. (This could make it hard to stand up.)     · You lose control of your bladder or bowels.    Watch closely for changes in your health, and be sure to contact your doctor if:    · You have a fever, lose weight, or don't feel well.     · You do not get better as expected. Where can you learn more? Go to https://DataWare VentureswillieCormedics.TELA Bio. org and sign in to your Cater to u account. Enter J630 in the NeuroSky box to learn more about \"Back Pain: Care Instructions. \"     If you do not have an account, please click on the \"Sign Up Now\" link.   Current as of: September 20, 2018  Content Version: 12.1  © 9994-1858 can take an over-the-counter medicine. What else can you do? · Stretch and exercise. Exercises that increase flexibility may relieve your pain and make it easier for your muscles to keep your spine in a good, neutral position. And don't forget to keep walking. · Do self-massage. You can use self-massage to unwind after work or school or to energize yourself in the morning. You can easily massage your feet, hands, or neck. Self-massage works best if you are in comfortable clothes and are sitting or lying in a comfortable position. Use oil or lotion to massage bare skin. · Reduce stress. Back pain can lead to a vicious Lumbee: Distress about the pain tenses the muscles in your back, which in turn causes more pain. Learn how to relax your mind and your muscles to lower your stress. Where can you learn more? Go to https://VenmopeImpermiumeweb.FuturaMedia. org and sign in to your NanoGram account. Enter L698 in the BF [employer]ities box to learn more about \"Learning About Relief for Back Pain. \"     If you do not have an account, please click on the \"Sign Up Now\" link. Current as of: September 20, 2018  Content Version: 12.1  © 0274-1310 [employer]ip[employer]. Care instructions adapted under license by [facility] If you have questions about a medical condition or this instruction, always ask your healthcare professional. [address] [address] any warranty or liability for your use of this information. Patient Education        Back Pain, Emergency or Urgent Symptoms: Care Instructions  Your Care Instructions    Many people have back pain at one time or another. In most cases, pain gets better with self-care that includes over-the-counter pain medicine, ice, heat, and exercises. Unless you have symptoms of a severe injury or heart attack, you may be able to give yourself a few days before you call a doctor. But some back problems are very serious.  Do not ignore symptoms that need to be checked right away. Follow-up care is a key part of your treatment and safety. Be sure to make and go to all appointments, and call your doctor if you are having problems. It's also a good idea to know your test results and keep a list of the medicines you take. How can you care for yourself at home? · Sit or lie in positions that are most comfortable and that reduce your pain. Try one of these positions when you lie down:  ? Lie on your back with your knees bent and supported by large pillows. ? Lie on the floor with your legs on the seat of a sofa or chair. ? Lie on your side with your knees and hips bent and a pillow between your legs. ? Lie on your stomach if it does not make pain worse. · Do not sit up in bed, and avoid soft couches and twisted positions. Bed rest can help relieve pain at first, but it delays healing. Avoid bed rest after the first day. · Change positions every 30 minutes. If you must sit for long periods of time, take breaks from sitting. Get up and walk around, or lie flat. · Try using a heating pad on a low or medium setting, for 15 to 20 minutes every 2 or 3 hours. Try a warm shower in place of one session with the heating pad. You can also buy single-use heat wraps that last up to 8 hours. You can also try ice or cold packs on your back for 10 to 20 minutes at a time, several times a day. (Put a thin cloth between the ice pack and your skin.) This reduces pain and makes it easier to be active and exercise. · Take pain medicines exactly as directed. ? If the doctor gave you a prescription medicine for pain, take it as prescribed. ? If you are not taking a prescription pain medicine, ask your doctor if you can take an over-the-counter medicine. When should you call for help? Call 911 anytime you think you may need emergency care.  For example, call if:    · You are unable to move a leg at all.     · You have back pain with severe belly pain.     · You have symptoms of a heart

## 2019-09-05 NOTE — PROGRESS NOTES
of motion and stable joint. No effusion. Left shoulder is quite tender focally at the [facility] joint. Patient has full range of motion without weakness. No effusion. Feet are flat with chronic swelling and very limited range of motion of ankles. Neurovascularly intact. Lymphadenopathy:     She has no cervical adenopathy. Neurological: She is alert. No obvious focal deficit. Skin: Skin is warm and dry. No rash noted. Psychiatric: She has a normal mood and affect. Her behavior is normal.   Vitals reviewed. Lab Results   Component Value Date    WBC 7.4 11/24/2018    HGB 13.6 11/24/2018    HCT 40.0 11/24/2018     11/24/2018     11/24/2018    K 3.7 11/24/2018     11/24/2018    CREATININE 0.67 11/24/2018    BUN 17 11/24/2018    CO2 24 11/24/2018    TSH 0.08 (L) 06/26/2017    LABA1C 6.8 05/02/2019       Assessment/Plan:   1. Chronic left-sided low back pain with left-sided sciatica  Chronic but symptoms worsening. No prior imaging. Given weight loss and age will check plain films. No acute signs of neurological change today. Will trial tylenol and add NSAID if needed. Will consider referral for physical therapy. Re-check in 3 weeks. - XR LUMBAR SPINE (2-3 VIEWS); Future  - XR LUMBAR SPINE (2-3 VIEWS)  - External Referral To Podiatry    2. Foot anomaly, congenital  Chronic. Gait may be impacting pain on left side. Will refer to podiatry for opinion. - XR FOOT LEFT (MIN 3 VIEWS); Future  - XR FOOT RIGHT (MIN 3 VIEWS); Future  - XR FOOT RIGHT (MIN 3 VIEWS)  - XR FOOT LEFT (MIN 3 VIEWS)  - External Referral To Podiatry    3. Left hip pain  Suspect trochanteric bursitis rather that referred pain from back based on exam. Will trial OTC medications initially. Check x-ray. If not improving consider steroid injection. - XR HIP LEFT (2-3 VIEWS); Future  - XR HIP LEFT (2-3 VIEWS)  - External Referral To Podiatry    4. Chronic left shoulder pain  Suspect arthritis.  Actually has pretty patientquestions answered. Pt voiced understanding. Reviewed health maintenance.                Electronically signed by Lois Bhardwaj MD on 9/5/2019 at 1:51 PM

## 2019-09-26 ENCOUNTER — OFFICE VISIT (OUTPATIENT)
Dept: FAMILY MEDICINE CLINIC | Age: 84
End: 2019-09-26
Payer: COMMERCIAL

## 2019-09-26 VITALS
HEART RATE: 81 BPM | DIASTOLIC BLOOD PRESSURE: 70 MMHG | OXYGEN SATURATION: 96 % | WEIGHT: 141 LBS | RESPIRATION RATE: 18 BRPM | BODY MASS INDEX: 24.98 KG/M2 | SYSTOLIC BLOOD PRESSURE: 118 MMHG

## 2019-09-26 DIAGNOSIS — M25.552 LEFT HIP PAIN: ICD-10-CM

## 2019-09-26 DIAGNOSIS — G89.29 CHRONIC LEFT SHOULDER PAIN: ICD-10-CM

## 2019-09-26 DIAGNOSIS — Q74.2 FOOT ANOMALY, CONGENITAL: ICD-10-CM

## 2019-09-26 DIAGNOSIS — G89.29 CHRONIC LEFT-SIDED LOW BACK PAIN WITH LEFT-SIDED SCIATICA: Primary | ICD-10-CM

## 2019-09-26 DIAGNOSIS — M54.42 CHRONIC LEFT-SIDED LOW BACK PAIN WITH LEFT-SIDED SCIATICA: Primary | ICD-10-CM

## 2019-09-26 DIAGNOSIS — Z23 NEED FOR INFLUENZA VACCINATION: ICD-10-CM

## 2019-09-26 DIAGNOSIS — M25.512 CHRONIC LEFT SHOULDER PAIN: ICD-10-CM

## 2019-09-26 PROCEDURE — 99214 OFFICE O/P EST MOD 30 MIN: CPT | Performed by: FAMILY MEDICINE

## 2019-09-26 PROCEDURE — G0008 ADMIN INFLUENZA VIRUS VAC: HCPCS | Performed by: FAMILY MEDICINE

## 2019-09-26 PROCEDURE — 90653 IIV ADJUVANT VACCINE IM: CPT | Performed by: FAMILY MEDICINE

## 2019-09-26 ASSESSMENT — ENCOUNTER SYMPTOMS
RHINORRHEA: 0
BLOOD IN STOOL: 0
EYE REDNESS: 0
SHORTNESS OF BREATH: 0
NAUSEA: 0
BACK PAIN: 1
ABDOMINAL PAIN: 0
EYE DISCHARGE: 0
COUGH: 1
DIARRHEA: 0
SORE THROAT: 0
CONSTIPATION: 0
VOMITING: 0

## 2019-09-26 NOTE — PATIENT INSTRUCTIONS
least 30 minutes of exercise on most days of the week. Walking is a good choice. You also may want to do other activities, such as running, swimming, cycling, or playing tennis or team sports. · Stretch your back muscles. Here are few exercises to try:  ? Lie on your back with your knees bent and your feet flat on the floor. Gently pull one bent knee to your chest. Put that foot back on the floor, and then pull the other knee to your chest. Hold for 15 to 30 seconds. Repeat 2 to 4 times. ? Do pelvic tilts. Lie on your back with your knees bent. Tighten your stomach muscles. Pull your belly button (navel) in and up toward your ribs. You should feel like your back is pressing to the floor and your hips and pelvis are slightly lifting off the floor. Hold for 6 seconds while breathing smoothly. · Keep your core muscles strong. The muscles of your back, belly (abdomen), and buttocks support your spine. ? Pull in your belly, and imagine pulling your navel toward your spine. Hold this for 6 seconds, then relax. Remember to keep breathing normally as you tense your muscles. ? Do curl-ups. Always do them with your knees bent. Keep your low back on the floor, and curl your shoulders toward your knees using a smooth, slow motion. Keep your arms folded across your chest. If this bothers your neck, try putting your hands behind your neck (not your head), with your elbows spread apart. ? Lie on your back with your knees bent and your feet flat on the floor. Tighten your belly muscles, and then push with your feet and raise your buttocks up a few inches. Hold this position 6 seconds as you continue to breathe normally, then lower yourself slowly to the floor. Repeat 8 to 12 times. ? If you like group exercise, try Pilates or yoga. These classes have poses that strengthen the core muscles.   Protect your back when you sit  · Place a small pillow, a rolled-up towel, or a lumbar roll in the curve of your back if you need extra support. · Sit in a chair that is low enough to let you place both feet flat on the floor with both knees nearly level with your hips. If your chair or desk is too high, use a foot rest to raise your knees. · When driving, keep your knees nearly level with your hips. Sit straight, and drive with both hands on the steering wheel. Your arms should be in a slightly bent position. · Try a kneeling chair, which helps tilt your hips forward. This takes pressure off your lower back. · Try sitting on an exercise ball. It can rock from side to side, which helps keep your back loose. Lift properly  · Squat down, bending at the hips and knees only. If you need to, put one knee to the floor and extend your other knee in front of you, bent at a right angle (half kneeling). · Press your chest straight forward. This helps keep your upper back straight while keeping a slight arch in your low back. · Hold the load as close to your body as possible, at the level of your navel. · Use your feet to change direction, taking small steps. · Lead with your hips as you change direction. Keep your shoulders in line with your hips as you move. Do not twist your body. · Set down your load carefully, squatting with your knees and hips only. When should you call for help? Watch closely for changes in your health, and be sure to contact your doctor if you have any problems. Where can you learn more? Go to https://SamanagewillieMetronom Health.[employer]. org and sign in to your Crowdrally account. Enter S810 in the [employer] box to learn more about \"Back Care and Preventing Injuries: Care Instructions. \"     If you do not have an account, please click on the \"Sign Up Now\" link. Current as of: September 20, 2018  Content Version: 12.1  © 8745-9413 Healthwise, Incorporated. Care instructions adapted under license by [facility] ([facility]).  If you have questions about a medical condition or this instruction, always ask your healthcare

## 2019-09-26 NOTE — PROGRESS NOTES
trochanteric bursa. Knee exam largely unremarkable. Mildly tender over medial joint line on the let with full range of motion and stable joint. No effusion. Left shoulder is quite tender focally at the [facility] [place] joint. Patient has full range of motion without weakness. No effusion. Feet are flat with chronic swelling and very limited range of motion of ankles. Neurovascularly intact. Lymphadenopathy:     She has no cervical adenopathy. Neurological: She is alert. No obvious focal deficit. Skin: Skin is warm and dry. No rash noted. Psychiatric: She has a normal mood and affect. Her behavior is normal.   Vitals reviewed. Lab Results   Component Value Date    WBC 7.4 11/24/2018    HGB 13.6 11/24/2018    HCT 40.0 11/24/2018     11/24/2018     11/24/2018    K 3.7 11/24/2018     11/24/2018    CREATININE 0.67 11/24/2018    BUN 17 11/24/2018    CO2 24 11/24/2018    TSH 0.08 (L) 06/26/2017    LABA1C 6.8 05/02/2019       Assessment/Plan:   1. Chronic left-sided low back pain with left-sided sciatica  Will add NSAID (did not start) and tylenol. Refer to PT. If not improved with PT would refer to pain management. Trial water exercises. - External Referral To Home Health    2. Foot anomaly, congenital  Stable. - External Referral To Home Health    3. Left hip pain  Will add NSAID (did not start) and tylenol. Refer to PT. If not improved with PT would refer to pain management. Trial water exercises. - External Referral To Home Health    4. Chronic left shoulder pain  Will add NSAID (did not start) and tylenol. Refer to PT. If not improved with PT would refer to pain management. Trial water exercises. - External Referral To Home Health    5. Need for influenza vaccination  Given.  - INFLUENZA, TRIV, INACTIVATED, SUBUNIT, ADJUVANTED, 65 YRS AND OLDER, IM, PREFILL SYR, 0.5ML (FLUAD TRIV)           Return in about 6 weeks (around 11/7/2019) for re-check pain.     Orders Placed   Orders

## 2019-09-27 ENCOUNTER — TELEPHONE (OUTPATIENT)
Dept: FAMILY MEDICINE CLINIC | Age: 84
End: 2019-09-27

## 2019-09-27 DIAGNOSIS — M25.512 CHRONIC LEFT SHOULDER PAIN: ICD-10-CM

## 2019-09-27 DIAGNOSIS — M25.552 LEFT HIP PAIN: Primary | ICD-10-CM

## 2019-09-27 DIAGNOSIS — G89.29 CHRONIC LEFT SHOULDER PAIN: ICD-10-CM

## 2019-09-27 DIAGNOSIS — R29.6 RECURRENT FALLS: ICD-10-CM

## 2019-09-27 DIAGNOSIS — M54.42 CHRONIC LEFT-SIDED LOW BACK PAIN WITH LEFT-SIDED SCIATICA: ICD-10-CM

## 2019-09-27 DIAGNOSIS — G89.29 CHRONIC LEFT-SIDED LOW BACK PAIN WITH LEFT-SIDED SCIATICA: ICD-10-CM

## 2019-10-08 ENCOUNTER — TELEPHONE (OUTPATIENT)
Dept: FAMILY MEDICINE CLINIC | Age: 84
End: 2019-10-08

## 2019-10-08 DIAGNOSIS — Q74.2 FOOT ANOMALY, CONGENITAL: ICD-10-CM

## 2019-10-08 DIAGNOSIS — R29.6 RECURRENT FALLS: ICD-10-CM

## 2019-10-08 DIAGNOSIS — G89.29 CHRONIC LEFT-SIDED LOW BACK PAIN WITH LEFT-SIDED SCIATICA: Primary | ICD-10-CM

## 2019-10-08 DIAGNOSIS — G89.29 CHRONIC LEFT SHOULDER PAIN: ICD-10-CM

## 2019-10-08 DIAGNOSIS — M25.512 CHRONIC LEFT SHOULDER PAIN: ICD-10-CM

## 2019-10-08 DIAGNOSIS — M54.42 CHRONIC LEFT-SIDED LOW BACK PAIN WITH LEFT-SIDED SCIATICA: Primary | ICD-10-CM

## 2019-10-08 DIAGNOSIS — M25.552 LEFT HIP PAIN: ICD-10-CM

## 2019-10-15 ENCOUNTER — TELEPHONE (OUTPATIENT)
Dept: FAMILY MEDICINE CLINIC | Age: 84
End: 2019-10-15

## 2019-10-28 ENCOUNTER — TELEPHONE (OUTPATIENT)
Dept: FAMILY MEDICINE CLINIC | Age: 84
End: 2019-10-28

## 2019-11-07 ENCOUNTER — OFFICE VISIT (OUTPATIENT)
Dept: FAMILY MEDICINE CLINIC | Age: 84
End: 2019-11-07
Payer: COMMERCIAL

## 2019-11-07 VITALS
HEART RATE: 88 BPM | WEIGHT: 140.4 LBS | BODY MASS INDEX: 24.88 KG/M2 | HEIGHT: 63 IN | RESPIRATION RATE: 16 BRPM | OXYGEN SATURATION: 98 % | SYSTOLIC BLOOD PRESSURE: 124 MMHG | DIASTOLIC BLOOD PRESSURE: 74 MMHG

## 2019-11-07 DIAGNOSIS — G89.29 CHRONIC LEFT-SIDED LOW BACK PAIN WITH LEFT-SIDED SCIATICA: ICD-10-CM

## 2019-11-07 DIAGNOSIS — N64.4 BREAST PAIN: Primary | ICD-10-CM

## 2019-11-07 DIAGNOSIS — M54.42 CHRONIC LEFT-SIDED LOW BACK PAIN WITH LEFT-SIDED SCIATICA: ICD-10-CM

## 2019-11-07 DIAGNOSIS — E03.9 HYPOTHYROIDISM, UNSPECIFIED TYPE: ICD-10-CM

## 2019-11-07 DIAGNOSIS — F32.A DEPRESSION, UNSPECIFIED DEPRESSION TYPE: ICD-10-CM

## 2019-11-07 PROCEDURE — 99214 OFFICE O/P EST MOD 30 MIN: CPT | Performed by: FAMILY MEDICINE

## 2019-11-07 RX ORDER — PAROXETINE HYDROCHLORIDE 40 MG/1
40 TABLET, FILM COATED ORAL EVERY MORNING
Qty: 90 TABLET | Refills: 1 | Status: SHIPPED | OUTPATIENT
Start: 2019-11-07 | End: 2020-02-27 | Stop reason: SDUPTHER

## 2019-11-07 RX ORDER — LEVOTHYROXINE SODIUM 137 UG/1
137 TABLET ORAL DAILY
Qty: 90 TABLET | Refills: 1 | Status: SHIPPED | OUTPATIENT
Start: 2019-11-07 | End: 2020-02-27 | Stop reason: SDUPTHER

## 2019-11-07 ASSESSMENT — ENCOUNTER SYMPTOMS
COUGH: 1
SHORTNESS OF BREATH: 0
NAUSEA: 0
RHINORRHEA: 0
ABDOMINAL PAIN: 0
BLOOD IN STOOL: 0
SORE THROAT: 0
CONSTIPATION: 0
EYE DISCHARGE: 0
DIARRHEA: 0
VOMITING: 0
BACK PAIN: 1
EYE REDNESS: 0

## 2019-11-20 ENCOUNTER — TELEPHONE (OUTPATIENT)
Dept: FAMILY MEDICINE CLINIC | Age: 84
End: 2019-11-20

## 2020-01-16 ENCOUNTER — TELEPHONE (OUTPATIENT)
Dept: FAMILY MEDICINE CLINIC | Age: 85
End: 2020-01-16

## 2020-01-16 NOTE — TELEPHONE ENCOUNTER
Patient is scheduled for diagnostic mammogram in [facility] on 1/23/2020 at 8:40 am. Patient instructed to not wear any lotion, powder, or Deoderant, voiced understanding and denied any other questions or concerns at this time.

## 2020-02-25 RX ORDER — ALBUTEROL SULFATE 90 UG/1
2 AEROSOL, METERED RESPIRATORY (INHALATION) EVERY 6 HOURS PRN
Qty: 1 INHALER | Refills: 3 | Status: CANCELLED | OUTPATIENT
Start: 2020-02-25

## 2020-02-25 NOTE — TELEPHONE ENCOUNTER
Patient needs all of her prescriptions to be sent to [facility] for 90 day supply she is switching to mail order.

## 2020-02-27 RX ORDER — LEVOTHYROXINE SODIUM 137 UG/1
137 TABLET ORAL DAILY
Qty: 90 TABLET | Refills: 1 | Status: SHIPPED | OUTPATIENT
Start: 2020-02-27 | End: 2020-06-15 | Stop reason: SDUPTHER

## 2020-02-27 RX ORDER — PAROXETINE HYDROCHLORIDE 40 MG/1
40 TABLET, FILM COATED ORAL EVERY MORNING
Qty: 90 TABLET | Refills: 1 | Status: SHIPPED | OUTPATIENT
Start: 2020-02-27 | End: 2020-06-15 | Stop reason: SDUPTHER

## 2020-03-30 ENCOUNTER — TELEPHONE (OUTPATIENT)
Dept: FAMILY MEDICINE CLINIC | Age: 85
End: 2020-03-30

## 2020-03-30 NOTE — TELEPHONE ENCOUNTER
I recommend physical distancing given COVID-19 with only essential interactions in person. As such we are attempting to handle all visits where in person contact is not essential via phone and our physical office location in [place] is currently closed. Please call patient. If patient is amenable and does not have acute concerns that can only be addressed in person, please convert upcoming visit to a video visit via haiku or doxy. me or to a phone visit if patient is unable to use one of these platforms. It is preferable to move this appointment to a sooner available virtual slot if patient is amenable but it is also fine to leave virtual appointment at the existing time if preferred. If acute needs that the patient believes can only be addressed in person, please send me a message (okay to call me at [phone] if urgent) and I will call the patient to make a plan. I am happy to speak directly with the patient there are urgent questions or concerns or if patient were to become ill in the coming weeks; during usual business hours the patient should contact the office at [phone]. I can be reached after usual business hours by calling the office at [phone] and pressing the option to urgently speak with the doctor on call or via cell phone at [phone].

## 2020-04-13 ENCOUNTER — TELEPHONE (OUTPATIENT)
Dept: FAMILY MEDICINE CLINIC | Age: 85
End: 2020-04-13

## 2020-04-13 ENCOUNTER — OFFICE VISIT (OUTPATIENT)
Dept: FAMILY MEDICINE CLINIC | Age: 85
End: 2020-04-13

## 2020-04-13 PROCEDURE — 99213 OFFICE O/P EST LOW 20 MIN: CPT | Performed by: FAMILY MEDICINE

## 2020-04-13 RX ORDER — NYSTATIN 100000 U/G
CREAM TOPICAL
Qty: 1 TUBE | Refills: 1 | Status: SHIPPED | OUTPATIENT
Start: 2020-04-13 | End: 2020-06-15

## 2020-04-13 RX ORDER — TRIAMCINOLONE ACETONIDE 1 MG/G
CREAM TOPICAL
Qty: 1 TUBE | Refills: 1 | Status: SHIPPED | OUTPATIENT
Start: 2020-04-13 | End: 2020-06-15 | Stop reason: SDUPTHER

## 2020-04-13 ASSESSMENT — ENCOUNTER SYMPTOMS
VOMITING: 0
NAIL CHANGES: 0
EYE PAIN: 0
SHORTNESS OF BREATH: 0
DIARRHEA: 0
COUGH: 0
RHINORRHEA: 0
SORE THROAT: 0

## 2020-04-13 NOTE — TELEPHONE ENCOUNTER
This patient can do visits through [facility]. me video visits. Please swap her follow-up visit in May to a [facility] visit at the same time that it is currently scheduled. She and staff are aware of the swap so no need to call. Please also send documentation of orders for triamcinolone cream 0.1% twice daily under left breast and nystatin cream 1000 units/gm apply twice daily under left breast.  Apply creams at the same time and mix together. Okay to fax this note as documentation. Thanks.

## 2020-04-13 NOTE — TELEPHONE ENCOUNTER
Please get patient set up for a video visit if possible so that I can see the rash and treat appropriately. (If shingles, may need different medication for example than if fungal). All that is needed for this visit is either a computer with internet access or a smart phone. If neither option is available, set up a phone visit.  I am available today until 6:30 pm.

## 2020-04-13 NOTE — PROGRESS NOTES
[address] [place] [id number]  Dept: [phone]  Dept Fax: [phone]  Loc: [phone]      Viola Homans is a 80 y.o. female who presents todayfor Rash      HPI:   This video encounter occurred with the patient Bassam Haley at [facility] assisted by nursing staff at the facility and with myself at home. Consent:  She and/or health care decision maker is aware that that she may receive a bill for this video service, depending on her insurance coverage, and has provided verbal consent to proceed: Yes         Rash   This is a new problem. The current episode started in the past 7 days (several days ago). The problem is unchanged. The affected locations include the chest. The rash is characterized by redness. Pertinent negatives include no anorexia, congestion, cough, diarrhea, eye pain, facial edema, fatigue, fever, joint pain, nail changes, rhinorrhea, shortness of breath, sore throat or vomiting. Past treatments include nothing. Her past medical history is significant for varicella (remote). There is no history of allergies, asthma or eczema. Rash appeared under left breast several days ago in the heat and has persisted. Not really painful or itchy but mildly uncomfortable. Is diabetic but does not check sugars. Last A1C was 6.8. Otherwise feeling well. Has follow-up scheduled in May and is amenable to swapping this to a video visit. No breast lump or mass. The patient is allergic to pcn [penicillins] and sulfa antibiotics. Past MedicalHistory  Mary Sampson  has a past medical history of Anxiety, Asthma, Diabetes ([address] Diverticulitis, Frequent UTI, Hearing loss, Hypothyroid, Irritable bowel, Recurrent falls, Stress incontinence, and Tarsal coalitions. Past Surgical History  The patient  has a past surgical history that includes Hysterectomy, total abdominal; Gallbladder surgery; Hand surgery;  [address] ear surgery; Tonsillectomy and adenoidectomy; Cataract removal; and Urethra surgery. Family History  This patient's family history includes Atrial Fibrillation in her mother; Breast Cancer in her mother. Social History  Stefanie  reports that she has never smoked. She has never used smokeless tobacco. She reports current alcohol use. She reports that she does not use drugs. Medications     Current Outpatient Medications:     nystatin (MYCOSTATIN) 171122 UNIT/GM cream, Apply topically 2 times daily. Mix a small amount of both creams together during application on affected area., Disp: 1 Tube, Rfl: 1    triamcinolone (KENALOG) 0.1 % cream, Apply topically 2 times daily. Mix a small amount of both creams together during application on affected area., Disp: 1 Tube, Rfl: 1    levothyroxine (SYNTHROID) 137 MCG tablet, Take 1 tablet by mouth Daily, Disp: 90 tablet, Rfl: 1    PARoxetine (PAXIL) 40 MG tablet, Take 1 tablet by mouth every morning, Disp: 90 tablet, Rfl: 1    albuterol sulfate (PROAIR RESPICLICK) 885 (90 Base) MCG/ACT aerosol powder inhalation, Inhale 2 puffs into the lungs every 6 hours as needed for Wheezing, Disp: 1 Inhaler, Rfl: 3    Multiple Vitamins-Minerals (EYE VITAMINS) CAPS, Take by mouth, Disp: , Rfl:     Multiple Vitamins-Minerals (WOMENS MULTIVITAMIN) TABS, Take by mouth, Disp: , Rfl:     Calcium Carb-Cholecalciferol (CALCIUM/VITAMIN D) 600-400 MG-UNIT TABS, Take by mouth daily , Disp: , Rfl:     Cranberry 1000 MG CAPS, Take by mouth, Disp: , Rfl:     Subjective:     Review of Systems   Constitutional: Negative for fatigue and fever. HENT: Negative for congestion, rhinorrhea and sore throat. Eyes: Negative for pain. Respiratory: Negative for cough and shortness of breath. Gastrointestinal: Negative for anorexia, diarrhea and vomiting. Musculoskeletal: Negative for joint pain. Skin: Positive for rash. Negative for nail changes.        Objective:         Physical Placed  No orders of the defined types were placed in this encounter. Prescriptions given/sent   Orders Placed This Encounter   Medications    nystatin (MYCOSTATIN) 763926 UNIT/GM cream     Sig: Apply topically 2 times daily. Mix a small amount of both creams together during application on affected area. Dispense:  1 Tube     Refill:  1    triamcinolone (KENALOG) 0.1 % cream     Sig: Apply topically 2 times daily. Mix a small amount of both creams together during application on affected area. Dispense:  1 Tube     Refill:  1       Patientinstructions given and reviewed. Discussed use, benefit, and side effects of prescribedmedications. All patient questions answered. Pt voiced understanding.                Electronically signed by Joyce Good MD on 4/17/2020at 12:27 PM

## 2020-04-13 NOTE — TELEPHONE ENCOUNTER
Called spoke to Claudette at [facility] home gave her the instruction of the 2 creams for the pt and that they were sent to [facility]. Faxed over the note to [facility] to the fax that Claudette provided.  Fax: [phone]

## 2020-04-13 NOTE — TELEPHONE ENCOUNTER
Called spoke to the pt she is able to do a telephone visit today 4-13-20 at 3pm verified the phone number. [facility] is only able to dot video visits thru [place] or [facility].

## 2020-04-17 ENCOUNTER — TELEPHONE (OUTPATIENT)
Dept: FAMILY MEDICINE CLINIC | Age: 85
End: 2020-04-17

## 2020-04-27 ENCOUNTER — TELEPHONE (OUTPATIENT)
Dept: FAMILY MEDICINE CLINIC | Age: 85
End: 2020-04-27

## 2020-04-27 NOTE — TELEPHONE ENCOUNTER
Tried to call the pt but her VM is full and couldn't leave a message will try her again. Her apt is set for 5-7-20 but can move up if needed due to her fall and ER visit.

## 2020-05-05 ENCOUNTER — TELEPHONE (OUTPATIENT)
Dept: ADMINISTRATIVE | Age: 85
End: 2020-05-05

## 2020-05-07 ENCOUNTER — TELEPHONE (OUTPATIENT)
Dept: FAMILY MEDICINE CLINIC | Age: 85
End: 2020-05-07

## 2020-05-07 ENCOUNTER — VIRTUAL VISIT (OUTPATIENT)
Dept: FAMILY MEDICINE CLINIC | Age: 85
End: 2020-05-07

## 2020-05-07 VITALS
HEART RATE: 74 BPM | DIASTOLIC BLOOD PRESSURE: 66 MMHG | TEMPERATURE: 97.7 F | SYSTOLIC BLOOD PRESSURE: 139 MMHG | OXYGEN SATURATION: 95 %

## 2020-05-07 PROCEDURE — 99214 OFFICE O/P EST MOD 30 MIN: CPT | Performed by: FAMILY MEDICINE

## 2020-05-07 ASSESSMENT — ENCOUNTER SYMPTOMS
EYE REDNESS: 0
COUGH: 0
ABDOMINAL PAIN: 0
EYE DISCHARGE: 0
BACK PAIN: 0
SORE THROAT: 0
NAUSEA: 0
VOMITING: 0
SHORTNESS OF BREATH: 0
DIARRHEA: 0
CONSTIPATION: 0

## 2020-05-07 NOTE — PROGRESS NOTES
  Cranberry 1000 MG CAPS, Take by mouth, Disp: , Rfl:     Subjective:      Review of Systems   Constitutional: Positive for fatigue. Negative for chills, fever and unexpected weight change. HENT: Negative for congestion, ear discharge, ear pain, hearing loss and sore throat. Eyes: Negative for discharge and redness. Respiratory: Negative for cough and shortness of breath. Cardiovascular: Negative for chest pain and palpitations. Gastrointestinal: Negative for abdominal pain, constipation, diarrhea, nausea and vomiting. Genitourinary: Negative for difficulty urinating and dysuria. Musculoskeletal: Positive for gait problem. Negative for arthralgias, back pain and neck pain. Skin: Positive for rash. Allergic/Immunologic: Negative for environmental allergies. Neurological: Negative for headaches. Psychiatric/Behavioral: Negative for dysphoric mood and sleep disturbance. The patient is not nervous/anxious. Objective:     Vitals:    05/07/20 1154   BP: 139/66   Pulse: 74   Temp: 97.7 °F (36.5 °C)   SpO2: 95%       Physical Exam  Constitutional:       General: She is not in acute distress. Appearance: Normal appearance. She is not ill-appearing or toxic-appearing. Comments: Mental status at baseline. Speaking in full sentences. Comfortable. HENT:      Head: Normocephalic and atraumatic. Nose: Nose normal.      Mouth/Throat:      Mouth: Mucous membranes are moist.   Eyes:      Conjunctiva/sclera: Conjunctivae normal.      Pupils: Pupils are equal, round, and reactive to light. Pulmonary:      Effort: Pulmonary effort is normal. No respiratory distress. Skin:     General: Skin is dry. Findings: No rash. Comments: Still some redness under left breast but improved. Neurological:      Mental Status: She is alert. Psychiatric:         Mood and Affect: Mood normal.         Behavior: Behavior normal.         Thought Content:  Thought content normal.         Lab

## 2020-05-07 NOTE — TELEPHONE ENCOUNTER
Nurse Patience Quinton called a couple days ago; patient recently status post hospital; on cipro for UTI but this is not agreeing with her. Did have fall with head injury and was seen in hospital; head CT with scalp hematoma only. Mild confusion but alert and talking and making sense overall with no focal weakness or slurred speech. [facility]; no culture was sent so only UA available. Agreed with D/C cipro; called in cephalexin 500 mg Po BID x 7 days to patient pharmacy. Indications for prompt return and ER presentation if worsening reviewed in detail. Close follow-up planned.

## 2020-05-08 NOTE — TELEPHONE ENCOUNTER
Faxed the [address] [place] [address] the last [address]     Fax: [phone]

## 2020-06-04 ENCOUNTER — TELEPHONE (OUTPATIENT)
Dept: FAMILY MEDICINE CLINIC | Age: 85
End: 2020-06-04

## 2020-06-04 NOTE — TELEPHONE ENCOUNTER
Erin Mariano daughter is calling Malorie Carson has an order for TSH, Tanya is going to draw her blood maris 06-05, need the order in epic faxed, eqk=[phone].

## 2020-06-15 ENCOUNTER — VIRTUAL VISIT (OUTPATIENT)
Dept: FAMILY MEDICINE CLINIC | Age: 85
End: 2020-06-15

## 2020-06-15 ENCOUNTER — TELEPHONE (OUTPATIENT)
Dept: FAMILY MEDICINE CLINIC | Age: 85
End: 2020-06-15

## 2020-06-15 PROBLEM — M19.90 ARTHRITIS: Status: ACTIVE | Noted: 2020-06-15

## 2020-06-15 PROBLEM — N30.10 CHRONIC INTERSTITIAL CYSTITIS: Status: ACTIVE | Noted: 2020-06-15

## 2020-06-15 PROBLEM — E78.5 HYPERLIPIDEMIA: Status: ACTIVE | Noted: 2020-06-15

## 2020-06-15 PROCEDURE — 99214 OFFICE O/P EST MOD 30 MIN: CPT | Performed by: FAMILY MEDICINE

## 2020-06-15 RX ORDER — PAROXETINE HYDROCHLORIDE 40 MG/1
40 TABLET, FILM COATED ORAL EVERY MORNING
Qty: 90 TABLET | Refills: 1 | Status: SHIPPED | OUTPATIENT
Start: 2020-06-15 | End: 2020-10-15 | Stop reason: SDUPTHER

## 2020-06-15 RX ORDER — KETOCONAZOLE 20 MG/G
CREAM TOPICAL
Qty: 30 G | Refills: 1 | Status: SHIPPED | OUTPATIENT
Start: 2020-06-15 | End: 2020-08-24 | Stop reason: SDUPTHER

## 2020-06-15 RX ORDER — LEVOTHYROXINE SODIUM 137 UG/1
137 TABLET ORAL DAILY
Qty: 90 TABLET | Refills: 1 | Status: SHIPPED | OUTPATIENT
Start: 2020-06-15 | End: 2020-06-29 | Stop reason: ALTCHOICE

## 2020-06-15 RX ORDER — TRIAMCINOLONE ACETONIDE 1 MG/G
CREAM TOPICAL
Qty: 1 TUBE | Refills: 1 | Status: SHIPPED | OUTPATIENT
Start: 2020-06-15

## 2020-06-15 ASSESSMENT — ENCOUNTER SYMPTOMS
ABDOMINAL PAIN: 0
CONSTIPATION: 0
COUGH: 0
DIARRHEA: 0
NAUSEA: 0
VOMITING: 0
EYE REDNESS: 0
BACK PAIN: 0
SHORTNESS OF BREATH: 0
SORE THROAT: 0
EYE DISCHARGE: 0

## 2020-06-15 NOTE — PROGRESS NOTES
incontinence    Recurrent falls    Hearing loss    Arthritis    Chronic interstitial cystitis    Hyperlipidemia       The patient is allergic to meperidine; metformin; pcn [penicillins]; sulfa antibiotics; and sulfamethoxazole-trimethoprim. Medical History  Marzena Cee has a past medical history of Anxiety, Asthma, Diabetes ([address] Diverticulitis, Frequent UTI, Hearing loss, Hypothyroid, Irritable bowel, Recurrent falls, Stress incontinence, and Tarsal coalitions. Past SurgicalHistory  The patient  has a past surgical history that includes Hysterectomy, total abdominal; Gallbladder surgery; Hand surgery; Inner ear surgery; Tonsillectomy and adenoidectomy; Cataract removal; and Urethra surgery. Family History  This patient's family history includes Atrial Fibrillation in her mother; Breast Cancer in her mother. Social History  Stefanie  reports that she has never smoked. She has never used smokeless tobacco. She reports current alcohol use. She reports that she does not use drugs. Medications    Current Outpatient Medications:     ketoconazole (NIZORAL) 2 % cream, Apply topically daily. , Disp: 30 g, Rfl: 1    triamcinolone (KENALOG) 0.1 % cream, Apply topically 2 times daily.   Mix a small amount of both creams together during application on affected area., Disp: 1 Tube, Rfl: 1    levothyroxine (SYNTHROID) 137 MCG tablet, Take 1 tablet by mouth Daily, Disp: 90 tablet, Rfl: 1    PARoxetine (PAXIL) 40 MG tablet, Take 1 tablet by mouth every morning, Disp: 90 tablet, Rfl: 1    albuterol sulfate (PROAIR RESPICLICK) 408 (90 Base) MCG/ACT aerosol powder inhalation, Inhale 2 puffs into the lungs every 6 hours as needed for Wheezing, Disp: 1 Inhaler, Rfl: 3    Multiple Vitamins-Minerals (EYE VITAMINS) CAPS, Take by mouth, Disp: , Rfl:     Multiple Vitamins-Minerals (WOMENS MULTIVITAMIN) TABS, Take by mouth, Disp: , Rfl:     Calcium Carb-Cholecalciferol (CALCIUM/VITAMIN D) 600-400 MG-UNIT TABS, Take by mouth daily , Disp: , Rfl:     Cranberry 1000 MG CAPS, Take by mouth, Disp: , Rfl:     Subjective:      Review of Systems   Constitutional: Positive for fatigue (improved). Negative for chills, fever and unexpected weight change. HENT: Negative for congestion, ear discharge, ear pain, hearing loss and sore throat. Eyes: Negative for discharge and redness. Respiratory: Negative for cough and shortness of breath. Cardiovascular: Negative for chest pain and palpitations. Gastrointestinal: Negative for abdominal pain, constipation, diarrhea, nausea and vomiting. Genitourinary: Negative for difficulty urinating and dysuria. Musculoskeletal: Negative for arthralgias, back pain, gait problem and neck pain. Skin: Positive for rash (improved). Allergic/Immunologic: Negative for environmental allergies. Neurological: Negative for headaches. Psychiatric/Behavioral: Negative for dysphoric mood and sleep disturbance. The patient is not nervous/anxious. Objective: There were no vitals filed for this visit. Physical Exam  Constitutional:       General: She is not in acute distress. Appearance: Normal appearance. She is not ill-appearing or toxic-appearing. Comments: Mental status at baseline. Speaking in full sentences. Comfortable. HENT:      Head: Normocephalic and atraumatic. Nose: Nose normal.      Mouth/Throat:      Mouth: Mucous membranes are moist.   Eyes:      Conjunctiva/sclera: Conjunctivae normal.      Pupils: Pupils are equal, round, and reactive to light. Pulmonary:      Effort: Pulmonary effort is normal. No respiratory distress. Skin:     General: Skin is dry. Findings: No rash. Comments: Still some redness under left breast but improved. Neurological:      Mental Status: She is alert. Psychiatric:         Mood and Affect: Mood normal.         Behavior: Behavior normal.         Thought Content:  Thought content normal.         Lab Results Component Value Date    WBC 7.4 11/24/2018    HGB 13.6 11/24/2018    HCT 40.0 11/24/2018     11/24/2018     11/24/2018    K 3.7 11/24/2018     11/24/2018    CREATININE 0.67 11/24/2018    BUN 17 11/24/2018    CO2 24 11/24/2018    TSH 0.08 (L) 06/26/2017    LABA1C 6.8 05/02/2019       [facility] Records:   1. Cand[place] intertrigo  Will add ketoconazole. Improving. Indications for prompt return if worsening reviewed in detail.    - ketoconazole (NIZORAL) 2 % cream; Apply topically daily. Dispense: 30 g; Refill: 1  - triamcinolone (KENALOG) 0.1 % cream; Apply topically 2 times daily. Mix a small amount of both creams together during application on affected area. Dispense: 1 Tube; Refill: 1    2. Hypothyroidism, unspecified type  Continue synthroid. - levothyroxine (SYNTHROID) 137 MCG tablet; Take 1 tablet by mouth Daily  Dispense: 90 tablet; Refill: 1    3. Depression, unspecified depression type  Stable on paxil.    - PARoxetine (PAXIL) 40 MG tablet; Take 1 tablet by mouth every morning  Dispense: 90 tablet; Refill: 1        Return in about 1 month (around 7/15/2020) for Follow-up chronic conditions. Orders Placed   No orders of the defined types were placed in this encounter. Prescriptions given/sent  Orders Placed This Encounter   Medications    ketoconazole (NIZORAL) 2 % cream     Sig: Apply topically daily. Dispense:  30 g     Refill:  1    triamcinolone (KENALOG) 0.1 % cream     Sig: Apply topically 2 times daily. Mix a small amount of both creams together during application on affected area. Dispense:  1 Tube     Refill:  1    levothyroxine (SYNTHROID) 137 MCG tablet     Sig: Take 1 tablet by mouth Daily     Dispense:  90 tablet     Refill:  1    PARoxetine (PAXIL) 40 MG tablet     Sig: Take 1 tablet by mouth every morning     Dispense:  90 tablet     Refill:  1       Educational materials provided. Discussed use, benefit, and side effects of prescribed medications.   All patient

## 2020-06-17 ENCOUNTER — TELEPHONE (OUTPATIENT)
Dept: FAMILY MEDICINE CLINIC | Age: 85
End: 2020-06-17

## 2020-06-17 NOTE — TELEPHONE ENCOUNTER
Spoke with Christina Pappas.  Patient needs Medicare AWV rescheduled as Dr. Janina Holguin is out of the office 7/20. Prefer to resched as a VV using doxy. me at [facility].  Preferably schedule after 7/20 for insurance purposes. Thank you.

## 2020-06-25 ENCOUNTER — TELEPHONE (OUTPATIENT)
Dept: FAMILY MEDICINE CLINIC | Age: 85
End: 2020-06-25

## 2020-06-25 NOTE — TELEPHONE ENCOUNTER
Patient daughter ROXANNA is calling stating patient was very disoriented last night and they are not sure why, they are thinking maybe a UTI, no fever, no other symptoms, she is wondering what they should do.  Please advise at [phone]

## 2020-06-25 NOTE — TELEPHONE ENCOUNTER
Spoke with and notified patients daughter Estefani Dangelo.  Voiced understanding and will take her to an urgent care or [facility] here in [place]

## 2020-06-29 ENCOUNTER — TELEPHONE (OUTPATIENT)
Dept: FAMILY MEDICINE CLINIC | Age: 85
End: 2020-06-29

## 2020-06-29 ENCOUNTER — OFFICE VISIT (OUTPATIENT)
Dept: FAMILY MEDICINE CLINIC | Age: 85
End: 2020-06-29
Payer: MEDICARE

## 2020-06-29 VITALS
BODY MASS INDEX: 25.69 KG/M2 | OXYGEN SATURATION: 97 % | DIASTOLIC BLOOD PRESSURE: 70 MMHG | WEIGHT: 145 LBS | HEIGHT: 63 IN | SYSTOLIC BLOOD PRESSURE: 118 MMHG | RESPIRATION RATE: 18 BRPM | HEART RATE: 79 BPM | TEMPERATURE: 98.5 F

## 2020-06-29 LAB
BILIRUBIN, POC: NEGATIVE
BLOOD URINE, POC: NORMAL
CLARITY, POC: NORMAL
COLOR, POC: YELLOW
GLUCOSE URINE, POC: 250
KETONES, POC: NORMAL
LEUKOCYTE EST, POC: NORMAL
NITRITE, POC: NEGATIVE
PH, POC: 6
PROTEIN, POC: 30
SPECIFIC GRAVITY, POC: 1.02
UROBILINOGEN, POC: 0.2

## 2020-06-29 PROCEDURE — 99214 OFFICE O/P EST MOD 30 MIN: CPT | Performed by: FAMILY MEDICINE

## 2020-06-29 PROCEDURE — 81003 URINALYSIS AUTO W/O SCOPE: CPT | Performed by: FAMILY MEDICINE

## 2020-06-29 PROCEDURE — 36415 COLL VENOUS BLD VENIPUNCTURE: CPT | Performed by: FAMILY MEDICINE

## 2020-06-29 RX ORDER — ACETAMINOPHEN 325 MG/1
650 TABLET ORAL EVERY 8 HOURS PRN
COMMUNITY

## 2020-06-29 RX ORDER — LEVOTHYROXINE SODIUM 0.15 MG/1
150 TABLET ORAL DAILY
Qty: 90 TABLET | Refills: 1 | Status: SHIPPED | OUTPATIENT
Start: 2020-06-29 | End: 2020-08-24 | Stop reason: CLARIF

## 2020-06-29 RX ORDER — LEVOTHYROXINE SODIUM 0.15 MG/1
150 TABLET ORAL DAILY
Qty: 30 TABLET | Refills: 0 | Status: SHIPPED | OUTPATIENT
Start: 2020-06-29 | End: 2020-06-29

## 2020-06-29 RX ORDER — NAPROXEN SODIUM 220 MG
220 TABLET ORAL 2 TIMES DAILY PRN
COMMUNITY

## 2020-06-29 RX ORDER — LEVOTHYROXINE SODIUM 0.15 MG/1
150 TABLET ORAL DAILY
Qty: 90 TABLET | Refills: 1 | Status: SHIPPED | OUTPATIENT
Start: 2020-06-29 | End: 2020-06-29 | Stop reason: SDUPTHER

## 2020-06-29 ASSESSMENT — ENCOUNTER SYMPTOMS
SHORTNESS OF BREATH: 0
DIARRHEA: 0
BACK PAIN: 0
EYE DISCHARGE: 0
COUGH: 0
EYE REDNESS: 0
ABDOMINAL PAIN: 0
SORE THROAT: 0
CONSTIPATION: 0
NAUSEA: 0
VOMITING: 0

## 2020-06-29 NOTE — PATIENT INSTRUCTIONS
you can lose your balance and fall. · Talk to your doctor if you have numbness in your feet. Preventing falls at home  · Remove raised doorway thresholds, throw rugs, and clutter. Repair loose carpet or raised areas in the floor. · Move furniture and electrical cords to keep them out of walking paths. · Use nonskid floor wax, and wipe up spills right away, especially on ceramic tile floors. · If you use a walker or cane, put rubber tips on it. If you use crutches, clean the bottoms of them regularly with an abrasive pad, such as steel wool. · Keep your house well lit, especially Abdirahman Knife, and outside walkways. Use night-lights in areas such as hallways and bathrooms. Add extra light switches or use remote switches (such as switches that go on or off when you clap your hands) to make it easier to turn lights on if you have to get up during the night. · Install sturdy handrails on stairways. · Move items in your cabinets so that the things you use a lot are on the lower shelves (about waist level). · Keep a cordless phone and a flashlight with new batteries by your bed. If possible, put a phone in each of the main rooms of your house, or carry a cell phone in case you fall and cannot reach a phone. Or, you can wear a device around your neck or wrist. You push a button that sends a signal for help. · Wear low-heeled shoes that fit well and give your feet good support. Use footwear with nonskid soles. Check the heels and soles of your shoes for wear. Repair or replace worn heels or soles. · Do not wear socks without shoes on wood floors. · Walk on the grass when the sidewalks are slippery. If you live in an area that gets snow and ice in the winter, sprinkle salt on slippery steps and sidewalks. Preventing falls in the bath  · Install grab bars and nonskid mats inside and outside your shower or tub and near the toilet and sinks. · Use shower chairs and bath benches.   · Use a hand-held shower head

## 2020-06-29 NOTE — PROGRESS NOTES
ear discharge, ear pain and sore throat. Eyes: Negative for discharge and redness. Respiratory: Negative for cough and shortness of breath. Cardiovascular: Negative for chest pain and palpitations. Gastrointestinal: Negative for abdominal pain, constipation, diarrhea, nausea and vomiting. Genitourinary: Negative for difficulty urinating and dysuria. Musculoskeletal: Positive for arthralgias (ankles) and gait problem. Negative for back pain and neck pain. Skin: Negative for rash. Allergic/Immunologic: Negative for environmental allergies. Neurological: Negative for dizziness, weakness and headaches. Psychiatric/Behavioral: Negative for dysphoric mood and sleep disturbance. The patient is not nervous/anxious. Objective:        Vitals:    06/29/20 1446   BP: 118/70   Site: Left Upper Arm   Position: Sitting   Pulse: 79   Resp: 18   Temp: 98.5 °F (36.9 °C)   TempSrc: Temporal   SpO2: 97%   Weight: 145 lb (65.8 kg)   Height: 5' 3\" (1.6 m)        Physical Exam  Vitals signs reviewed. Constitutional:       Appearance: She is well-developed. HENT:      Head: Normocephalic and atraumatic. Eyes:      Conjunctiva/sclera: Conjunctivae normal.      Pupils: Pupils are equal, round, and reactive to light. Neck:      Musculoskeletal: Neck supple. Thyroid: No thyromegaly. Cardiovascular:      Rate and Rhythm: Normal rate and regular rhythm. Heart sounds: Normal heart sounds. Pulmonary:      Effort: Pulmonary effort is normal. No respiratory distress. Breath sounds: Normal breath sounds. Abdominal:      Palpations: Abdomen is soft. Tenderness: There is no abdominal tenderness. Musculoskeletal:      Right lower leg: Edema present. Left lower leg: Edema present. Comments: Trace edema bilateral ankles; pulses intact; no new findings otherwise. Lymphadenopathy:      Cervical: No cervical adenopathy. Skin:     General: Skin is warm and dry.       Findings: No

## 2020-06-30 LAB
ALBUMIN SERPL-MCNC: 4 G/DL (ref 3.5–5.1)
ALP BLD-CCNC: 71 U/L (ref 38–126)
ALT SERPL-CCNC: 12 U/L (ref 11–66)
ANION GAP SERPL CALCULATED.3IONS-SCNC: 13 MEQ/L (ref 8–16)
AST SERPL-CCNC: 17 U/L (ref 5–40)
AVERAGE GLUCOSE: 180 MG/DL (ref 70–126)
BASOPHILS # BLD: 0.7 %
BASOPHILS ABSOLUTE: 0.1 THOU/MM3 (ref 0–0.1)
BILIRUB SERPL-MCNC: 0.5 MG/DL (ref 0.3–1.2)
BUN BLDV-MCNC: 16 MG/DL (ref 7–22)
CALCIUM SERPL-MCNC: 9.8 MG/DL (ref 8.5–10.5)
CHLORIDE BLD-SCNC: 98 MEQ/L (ref 98–111)
CO2: 27 MEQ/L (ref 23–33)
CREAT SERPL-MCNC: 0.7 MG/DL (ref 0.4–1.2)
EOSINOPHIL # BLD: 2.8 %
EOSINOPHILS ABSOLUTE: 0.2 THOU/MM3 (ref 0–0.4)
ERYTHROCYTE [DISTWIDTH] IN BLOOD BY AUTOMATED COUNT: 11.9 % (ref 11.5–14.5)
ERYTHROCYTE [DISTWIDTH] IN BLOOD BY AUTOMATED COUNT: 41.3 FL (ref 35–45)
GFR SERPL CREATININE-BSD FRML MDRD: 79 ML/MIN/1.73M2
GLUCOSE BLD-MCNC: 182 MG/DL (ref 70–108)
HBA1C MFR BLD: 8 % (ref 4.4–6.4)
HCT VFR BLD CALC: 41.8 % (ref 37–47)
HEMOGLOBIN: 13.9 GM/DL (ref 12–16)
IMMATURE GRANS (ABS): 0.03 THOU/MM3 (ref 0–0.07)
IMMATURE GRANULOCYTES: 0.3 %
LYMPHOCYTES # BLD: 22.8 %
LYMPHOCYTES ABSOLUTE: 2 THOU/MM3 (ref 1–4.8)
MCH RBC QN AUTO: 32 PG (ref 26–33)
MCHC RBC AUTO-ENTMCNC: 33.3 GM/DL (ref 32.2–35.5)
MCV RBC AUTO: 96.3 FL (ref 81–99)
MONOCYTES # BLD: 8.4 %
MONOCYTES ABSOLUTE: 0.7 THOU/MM3 (ref 0.4–1.3)
NUCLEATED RED BLOOD CELLS: 0 /100 WBC
PLATELET # BLD: 240 THOU/MM3 (ref 130–400)
PMV BLD AUTO: 11.9 FL (ref 9.4–12.4)
POTASSIUM SERPL-SCNC: 4.2 MEQ/L (ref 3.5–5.2)
RBC # BLD: 4.34 MILL/MM3 (ref 4.2–5.4)
SEG NEUTROPHILS: 65 %
SEGMENTED NEUTROPHILS ABSOLUTE COUNT: 5.8 THOU/MM3 (ref 1.8–7.7)
SODIUM BLD-SCNC: 138 MEQ/L (ref 135–145)
T4 FREE: 1.26 NG/DL (ref 0.93–1.76)
TOTAL PROTEIN: 7.3 G/DL (ref 6.1–8)
TSH SERPL DL<=0.05 MIU/L-ACNC: 8.24 UIU/ML (ref 0.4–4.2)
WBC # BLD: 8.9 THOU/MM3 (ref 4.8–10.8)

## 2020-06-30 NOTE — TELEPHONE ENCOUNTER
I had just enough urine to culture last night- sent out to lab this morning. Also called lab and they believe they have enough to do an A1C- order released. Did not have enough urine to send for micro.   faxed order to [facility]

## 2020-06-30 NOTE — TELEPHONE ENCOUNTER
Note that I had spoken with nursing home regarding patient on Friday. Her symptoms were not acute and had been ongoing for 8 weeks with no injury in recent fall. Scheduled for visit. See note from today.

## 2020-06-30 NOTE — TELEPHONE ENCOUNTER
Also given UA needs culture and microscopy. If sample collected in office remains, please send both. If not give orders to [facility] for new collection. Thanks.

## 2020-07-02 LAB
ORGANISM: ABNORMAL
URINE CULTURE, ROUTINE: ABNORMAL

## 2020-07-06 ENCOUNTER — TELEPHONE (OUTPATIENT)
Dept: FAMILY MEDICINE CLINIC | Age: 85
End: 2020-07-06

## 2020-07-06 NOTE — TELEPHONE ENCOUNTER
----- Message from Jared Chaudhari MD sent at 7/2/2020 10:58 AM EDT -----  Good news no UTI. Will need repeat UA with micro on follow-up since trace blood in urine.

## 2020-07-09 ENCOUNTER — VIRTUAL VISIT (OUTPATIENT)
Dept: FAMILY MEDICINE CLINIC | Age: 85
End: 2020-07-09
Payer: MEDICARE

## 2020-07-09 ENCOUNTER — TELEPHONE (OUTPATIENT)
Dept: FAMILY MEDICINE CLINIC | Age: 85
End: 2020-07-09

## 2020-07-09 PROCEDURE — 3052F HG A1C>EQUAL 8.0%<EQUAL 9.0%: CPT | Performed by: FAMILY MEDICINE

## 2020-07-09 PROCEDURE — 99214 OFFICE O/P EST MOD 30 MIN: CPT | Performed by: FAMILY MEDICINE

## 2020-07-09 ASSESSMENT — ENCOUNTER SYMPTOMS
ABDOMINAL PAIN: 0
EYE REDNESS: 0
NAUSEA: 0
SHORTNESS OF BREATH: 0
CONSTIPATION: 0
DIARRHEA: 0
COUGH: 0
VOMITING: 0
SORE THROAT: 0
EYE DISCHARGE: 0
BACK PAIN: 0

## 2020-07-09 NOTE — TELEPHONE ENCOUNTER
Please call [facility] and give order for repeat TSH in 6 weeks, repeat UA with microscopy and culture in 6 weeks also. Schedule follow-up virtual video visit for 7 weeks at a time that is convenient. Please notify daughter Jag Solid or follow-up visit time. No change in medications; confirm that they have thyroid medication at 150 mcg dose.

## 2020-07-09 NOTE — PROGRESS NOTES
small amount of both creams together during application on affected area. Linda Farah MD   PARoxetine (PAXIL) 40 MG tablet Take 1 tablet by mouth every morning  Linda Farah MD   albuterol sulfate (PROAIR RESPICLICK) 751 (90 Base) MCG/ACT aerosol powder inhalation Inhale 2 puffs into the lungs every 6 hours as needed for Wheezing  Linda Farah MD   Multiple Vitamins-Minerals (EYE VITAMINS) CAPS Take by mouth  Historical Provider, MD   Multiple Vitamins-Minerals (WOMENS MULTIVITAMIN) TABS Take by mouth  Historical Provider, MD   Calcium Carb-Cholecalciferol (CALCIUM/VITAMIN D) 600-400 MG-UNIT TABS Take by mouth daily   Historical Provider, MD   Cranberry 1000 MG CAPS Take by mouth  Historical Provider, MD       Social History     Tobacco Use    Smoking status: Never Smoker    Smokeless tobacco: Never Used   Substance Use Topics    Alcohol use: Yes     Comment: occasional    Drug use: Never        Allergies   Allergen Reactions    Meperidine     Metformin      Causes severe nausea and vomiting.  Pcn [Penicillins]     Sulfa Antibiotics     Sulfamethoxazole-Trimethoprim        PHYSICAL EXAMINATION:  [ INSTRUCTIONS:  \"[x]\" Indicates a positive item  \"[]\" Indicates a negative item  -- DELETE ALL ITEMS NOT EXAMINED]  Vital Signs: (As obtained by patient/caregiver or practitioner observation)    Blood pressure-  Heart rate-    Respiratory rate-    Temperature-  Pulse oximetry-     Constitutional: [x] Appears well-developed and well-nourished [x] No apparent distress      [] Abnormal-   Mental status  [x] Alert and awake  [] Oriented to person/place/time [x]Able to follow commands      Eyes:  EOM    []  Normal  [] Abnormal-  Sclera  [x]  Normal  [] Abnormal -         Discharge [x]  None visible  [] Abnormal -    HENT:   [x] Normocephalic, atraumatic.   [] Abnormal   [x] Mouth/Throat: Mucous membranes are moist.     External Ears [x] Normal  [] Abnormal-     Neck: [x] No visualized mass Pulmonary/Chest: [x] Respiratory effort normal.  [x] No visualized signs of difficulty breathing or respiratory distress        [] Abnormal-      Musculoskeletal:   [] Normal gait with no signs of ataxia         [x] Normal range of motion of neck        [] Abnormal-       Neurological:        [x] No Facial Asymmetry (Cranial nerve 7 motor function) (limited exam to video visit)          [] No gaze palsy        [] Abnormal-         Skin:        [x] No significant exanthematous lesions or discoloration noted on facial skin         [] Abnormal-            Psychiatric:       [x] Normal Affect [] No Hallucinations        [] Abnormal-     Other pertinent observable physical exam findings-     ASSESSMENT/PLAN:    1. Type 2 diabetes mellitus without complication, without long-term current use of insulin (HCC)  HbA1c was 8.0 today from previous 6.8  -Remains at goal for age, continue diet management    2. Hypothyroidism, unspecified type  Tsh was 8.240 today, while T4 was 1.26  -TSH going in right direction, stay on dose  -Repeat testing in 6 weeks    3. Recurrent falls  No clear cause via labs, could be due to hypothyroid but that is being corrected. -will continue to monitor    4. Frequent UTI  UA had trace blood and mixed growth, not specific for UTI  -Will repeat UA in 6 weeks    5. Urinary incontinence, unspecified type  As above    6. Edema, unspecified type  Slightly improved from last visit, non-specific  -continue to monitor    7. Depression, unspecified depression type  Stable on current regimen      No follow-ups on file. Lashaun Bruno is a 80 y.o. female being evaluated by a Virtual Visit (video visit) encounter to address concerns as mentioned above. A caregiver was present when appropriate. Due to this being a TeleHealth encounter ([facility] emergency), evaluation of the following organ systems was limited: Vitals/Constitutional/EENT/Resp/CV/GI//MS/Neuro/Skin/Heme-Lymph-Imm. Pursuant to the emergency declaration under the [facility] authority and the [employer] Act, this Virtual Visit was conducted with patient's (and/or legal guardian's) consent, to reduce the patient's risk of exposure to COVID-19 and provide necessary medical care. The patient (and/or legal guardian) has also been advised to contact this office for worsening conditions or problems, and seek emergency medical treatment and/or call 911 if deemed necessary. Patient identification was verified at the start of the visit: Yes    Total time spent on this encounter: 20    Services were provided through a video synchronous discussion virtually to substitute for in-person clinic visit. Patient and provider were located at their individual homes. --Franko Espana MD on 7/9/2020 at 1:34 PM     Attending attestation:  I performed a history and physical examination of the patient and discussed management with the resident. I reviewed the resident's note and agree with the documented findings and plan of care. Noe Campoverde is doing well today. No issues. Feeling well. Adjusted thyroid medication. Will re-check in 6 weeks and repeat UA to follow-up on trace blood. Indications for prompt return if worsening reviewed in detail. No concerns from daughter. She was able to resume outside visits on the porch. No new COVID-19 cases at facility. An electronic signature was used to authenticate this note.

## 2020-08-19 LAB
BILIRUBIN, URINE: NEGATIVE
BLOOD, URINE: NEGATIVE
CLARITY: ABNORMAL
COLOR: YELLOW
GLUCOSE URINE: 100
KETONES, URINE: NEGATIVE
LEUKOCYTE ESTERASE, URINE: ABNORMAL
NITRITE, URINE: NEGATIVE
PH UA: ABNORMAL
PROTEIN UA: NEGATIVE
SPECIFIC GRAVITY, URINE: 1.02
TSH SERPL DL<=0.05 MIU/L-ACNC: 5.37 UIU/ML
UROBILINOGEN, URINE: NORMAL

## 2020-08-24 ENCOUNTER — OFFICE VISIT (OUTPATIENT)
Dept: FAMILY MEDICINE CLINIC | Age: 85
End: 2020-08-24
Payer: MEDICARE

## 2020-08-24 VITALS
SYSTOLIC BLOOD PRESSURE: 118 MMHG | HEART RATE: 64 BPM | TEMPERATURE: 98.6 F | RESPIRATION RATE: 16 BRPM | DIASTOLIC BLOOD PRESSURE: 64 MMHG | BODY MASS INDEX: 24.91 KG/M2 | WEIGHT: 140.6 LBS

## 2020-08-24 PROCEDURE — 3052F HG A1C>EQUAL 8.0%<EQUAL 9.0%: CPT | Performed by: FAMILY MEDICINE

## 2020-08-24 PROCEDURE — 99214 OFFICE O/P EST MOD 30 MIN: CPT | Performed by: FAMILY MEDICINE

## 2020-08-24 RX ORDER — NYSTATIN 100000 U/G
CREAM TOPICAL
COMMUNITY
Start: 2020-08-12 | End: 2020-08-24 | Stop reason: ALTCHOICE

## 2020-08-24 RX ORDER — KETOCONAZOLE 20 MG/G
CREAM TOPICAL
Qty: 30 G | Refills: 1 | Status: SHIPPED | OUTPATIENT
Start: 2020-08-24

## 2020-08-24 RX ORDER — LEVOTHYROXINE SODIUM 175 UG/1
175 TABLET ORAL DAILY
Qty: 60 TABLET | Refills: 0 | Status: SHIPPED | OUTPATIENT
Start: 2020-08-24 | End: 2020-10-01 | Stop reason: SDUPTHER

## 2020-08-24 ASSESSMENT — ENCOUNTER SYMPTOMS
RHINORRHEA: 0
DIARRHEA: 0
EYE REDNESS: 0
CONSTIPATION: 0
BACK PAIN: 0
SHORTNESS OF BREATH: 0
COUGH: 0
ABDOMINAL PAIN: 0

## 2020-08-24 NOTE — PROGRESS NOTES
Denis Ruiz is a 80 y.o. female who presents today for:  Chief Complaint   Patient presents with    Follow-up       Goals    None         HPI:     HPI  Patient uses UTI strips at home that show some \"nitrite\" for her. Patient denies dysuria, frequency, urgency. Takes cranberry three times a day. Patient takes her thyroid medication with food after eating. Patient has not had any falls. \"Trying hard to hang onto walker\". Rash under left breast is getting better. Patient still using cream.    No question data found. 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    HYSTERECTOMY, TOTAL ABDOMINAL      INNER EAR SURGERY      uncertain nature; remote    TONSILLECTOMY AND ADENOIDECTOMY      URETHRA SURGERY      15 years ago, mesh placed for stress incontinence     Family History   Problem Relation Age of Onset    Breast Cancer Mother     Atrial Fibrillation Mother      Social History     Tobacco Use    Smoking status: Never Smoker    Smokeless tobacco: Never Used   Substance Use Topics    Alcohol use: Yes     Comment: occasional      Current Outpatient Medications   Medication Sig Dispense Refill    levothyroxine (SYNTHROID) 175 MCG tablet Take 1 tablet by mouth daily 60 tablet 0    ketoconazole (NIZORAL) 2 % cream Apply topically daily.  30 g 1    naproxen sodium (ALEVE) 220 MG tablet Take 220 mg by mouth 2 times daily as needed for Pain      acetaminophen (TYLENOL) 325 MG tablet Take 650 mg by mouth every 8 hours as needed for Pain      triamcinolone (KENALOG) 0.1 % cream Apply topically 2 times daily. Mix a small amount of both creams together during application on affected area. 1 Tube 1    PARoxetine (PAXIL) 40 MG tablet Take 1 tablet by mouth every morning 90 tablet 1    Multiple Vitamins-Minerals (EYE VITAMINS) CAPS Take by mouth      Multiple Vitamins-Minerals (WOMENS MULTIVITAMIN) TABS Take by mouth      Calcium Carb-Cholecalciferol (CALCIUM/VITAMIN D) 600-400 MG-UNIT TABS Take by mouth daily       Cranberry 1000 MG CAPS Take by mouth      albuterol sulfate (PROAIR RESPICLICK) 997 (90 Base) MCG/ACT aerosol powder inhalation Inhale 2 puffs into the lungs every 6 hours as needed for Wheezing (Patient not taking: Reported on 8/24/2020) 1 Inhaler 3     No current facility-administered medications for this visit. Allergies   Allergen Reactions    Meperidine     Metformin      Causes severe nausea and vomiting.  Pcn [Penicillins]     Sulfa Antibiotics     Sulfamethoxazole-Trimethoprim        Health Maintenance   Topic Date Due    Shingles Vaccine (1 of 2) [birth year]/1981    Annual Wellness Visit (AWV)  06/29/2020    Flu vaccine (1) 09/01/2020    TSH testing  08/19/2021    DTaP/Tdap/Td vaccine (2 - Td) 05/07/2028    Pneumococcal 65+ years Vaccine  Completed    Hepatitis A vaccine  Aged Out    Hib vaccine  Aged Out    Meningococcal (ACWY) vaccine  Aged Out       Subjective:      Review of Systems   Constitutional: Positive for fatigue (stable). Negative for chills and fever. HENT: Negative for congestion and rhinorrhea. Eyes: Negative for redness and visual disturbance. Respiratory: Negative for cough and shortness of breath. Cardiovascular: Negative for chest pain and leg swelling. Gastrointestinal: Negative for abdominal pain, constipation and diarrhea. Genitourinary: Negative for dysuria and hematuria. Musculoskeletal: Negative for back pain and myalgias. Skin: Positive for rash (improved). Negative for wound. 4.2 06/29/2020    CL 98 06/29/2020    CREATININE 0.7 06/29/2020    BUN 16 06/29/2020    CO2 27 06/29/2020    TSH 5.37 (H) 08/19/2020    LABA1C 8.0 (H) 06/29/2020    LABGLOM 79 (A) 06/29/2020    CALCIUM 9.8 06/29/2020       Imaging Results:    No results found. Assessment / Plan:     1. Hypothyroidism, unspecified type  TSH still a bit high. Will titrate up to 175 mcg and re-check in 1 month. - levothyroxine (SYNTHROID) 175 MCG tablet; Take 1 tablet by mouth daily  Dispense: 60 tablet; Refill: 0    2. Frequent UTI  No symptoms. Given absence of symptoms, no further testing is indicated this time. 3. Recurrent falls  Stable. Medication adjusted. 4. Type 2 diabetes mellitus without complication, without long-term current use of insulin (HCC)  Stable. 5. Candidal intertrigo  Will swap to ketoconazole cream for 2 weeks and then as needed. Almost resolved. - ketoconazole (NIZORAL) 2 % cream; Apply topically daily. Dispense: 30 g; Refill: 1    HgA1c will recheck in a month    Urine dipstick shows positive for glucose and positive for leukocytes. Patient denies symptoms  Patient still taking cranberry extract. Patient denies falls. Encouraged continuing to use walker and life support button. Return in about 7 weeks (around 10/12/2020) for Hypothyroid follow up and chronic dz. Medications Prescribed:  Orders Placed This Encounter   Medications    levothyroxine (SYNTHROID) 175 MCG tablet     Sig: Take 1 tablet by mouth daily     Dispense:  60 tablet     Refill:  0    ketoconazole (NIZORAL) 2 % cream     Sig: Apply topically daily. Dispense:  30 g     Refill:  1       Future Appointments   Date Time Provider Matthew Fajardo   10/12/2020  4:30 PM Chuck Mann MD [address] [facility]       Patient given educational materials - see patient instructions. Discussed use, benefit, and side effects of prescribed medications. All patient questions answered.   Patient voiced understanding. Reviewed health maintenance. Instructed to continue current medications, diet and exercise. Patient agreed with treatment plan. Follow up as directed. Attending attestation:  I personally performed and participated key or critical portions of the evaluation and management including personally performing the exam and medical decision making. I verify the accuracy of the documentation by the resident with the following addition or changes: Largely at baseline today. Thyroid medication adjusted. Mood stable. Will re-check virtually.         Electronically signed by Kristy Villaseñor MD on 8/24/2020 at 3:23 PM      Electronically signed by Kristy Villaseñor MD on 8/24/2020 at 3:21 PM

## 2020-08-27 ENCOUNTER — TELEPHONE (OUTPATIENT)
Dept: FAMILY MEDICINE CLINIC | Age: 85
End: 2020-08-27

## 2020-08-27 NOTE — TELEPHONE ENCOUNTER
----- Message from Jesse Thapa MD sent at 8/27/2020  9:27 AM EDT -----  Please call and check and see how Samuel Adams is feeling. If she is having urinary symptoms such as dysuria, burning, frequency, etc.  I would treat. If no symptoms, I suspect contamination and will follow clinically.

## 2020-08-27 NOTE — TELEPHONE ENCOUNTER
[address] was gone for the day- left voicemail for her to return call regarding urine results

## 2020-09-17 ENCOUNTER — TELEPHONE (OUTPATIENT)
Dept: FAMILY MEDICINE CLINIC | Age: 85
End: 2020-09-17

## 2020-09-21 NOTE — TELEPHONE ENCOUNTER
[place] Pablo L[place] [facility] [place]- left message to return call regarding patient and her pain

## 2020-09-21 NOTE — TELEPHONE ENCOUNTER
Spoke with Sanna Barragan- she states that patient gets confused on when she can have the tylenol and IBU- when she does have the tylenol and IBU patient has no complaints of pain- she has an appointment with [facility] on Wed at 10:00

## 2020-10-05 RX ORDER — LEVOTHYROXINE SODIUM 175 UG/1
175 TABLET ORAL DAILY
Qty: 60 TABLET | Refills: 0 | Status: SHIPPED | OUTPATIENT
Start: 2020-10-05 | End: 2020-10-15 | Stop reason: SDUPTHER

## 2020-10-09 ENCOUNTER — NURSE ONLY (OUTPATIENT)
Dept: FAMILY MEDICINE CLINIC | Age: 85
End: 2020-10-09
Payer: MEDICARE

## 2020-10-09 LAB
CHOLESTEROL, FASTING: 182 MG/DL (ref 100–199)
HDLC SERPL-MCNC: 55 MG/DL
LDL CHOLESTEROL CALCULATED: 109 MG/DL
TRIGLYCERIDE, FASTING: 90 MG/DL (ref 0–199)
TSH SERPL DL<=0.05 MIU/L-ACNC: 2.07 UIU/ML (ref 0.4–4.2)

## 2020-10-09 PROCEDURE — 36415 COLL VENOUS BLD VENIPUNCTURE: CPT | Performed by: NURSE PRACTITIONER

## 2020-10-10 LAB
AVERAGE GLUCOSE: 192 MG/DL (ref 70–126)
HBA1C MFR BLD: 8.4 % (ref 4.4–6.4)

## 2020-10-15 ENCOUNTER — VIRTUAL VISIT (OUTPATIENT)
Dept: FAMILY MEDICINE CLINIC | Age: 85
End: 2020-10-15
Payer: MEDICARE

## 2020-10-15 ENCOUNTER — TELEPHONE (OUTPATIENT)
Dept: FAMILY MEDICINE CLINIC | Age: 85
End: 2020-10-15

## 2020-10-15 PROCEDURE — 99422 OL DIG E/M SVC 11-20 MIN: CPT | Performed by: FAMILY MEDICINE

## 2020-10-15 RX ORDER — PAROXETINE HYDROCHLORIDE 40 MG/1
40 TABLET, FILM COATED ORAL EVERY MORNING
Qty: 90 TABLET | Refills: 1 | Status: SHIPPED | OUTPATIENT
Start: 2020-10-15 | End: 2021-01-25

## 2020-10-15 RX ORDER — LEVOTHYROXINE SODIUM 175 UG/1
175 TABLET ORAL DAILY
Qty: 60 TABLET | Refills: 2 | Status: SHIPPED | OUTPATIENT
Start: 2020-10-15 | End: 2020-10-30 | Stop reason: SDUPTHER

## 2020-10-15 RX ORDER — ALBUTEROL SULFATE 90 UG/1
2 POWDER, METERED RESPIRATORY (INHALATION) EVERY 6 HOURS PRN
Qty: 1 INHALER | Refills: 3 | Status: SHIPPED | OUTPATIENT
Start: 2020-10-15

## 2020-10-15 ASSESSMENT — ENCOUNTER SYMPTOMS
COUGH: 0
SHORTNESS OF BREATH: 0
VOMITING: 0
DIARRHEA: 0
NAUSEA: 0
CONSTIPATION: 0
ABDOMINAL PAIN: 0

## 2020-10-15 NOTE — PROGRESS NOTES
10/15/2020    TELEHEALTH EVALUATION -- Audio/Visual (During [facility]-08 public health emergency)    HPI:    Sterling Noel (:  [id number]) has requested an audio/video evaluation for the following concern(s):    Diabetes: reviewed labs. HbA1C increased to 8.4 Was previously 8.0 2020. Discussed medications and getting A1C around 8. Noted allergy to Metformin (severe nausea and vomiting). Blood pressure: doing well    Hypothyroidism: TSH now in range at 2.070 with change in Synthroid. Doing well. Depression: overall doing well on Paxil    Recent fall: slipped in shower and fell backward. Still having pain, but states it is improving. No weakness, numbness, loss of bowel or bladder control    Rash under breasts: doing better, seems to be healing    Review of Systems   Constitutional: Negative for fatigue and fever. HENT: Negative for congestion. Eyes: Negative for visual disturbance. Respiratory: Negative for cough and shortness of breath. Cardiovascular: Negative for chest pain and palpitations. Gastrointestinal: Negative for abdominal pain, constipation, diarrhea, nausea and vomiting. Genitourinary: Negative for dysuria and menstrual problem. Musculoskeletal: Negative for arthralgias. Skin: Negative for rash. Neurological: Negative for headaches. Prior to Visit Medications    Medication Sig Taking?  Authorizing Provider   zoster recombinant adjuvanted vaccine [facility]) 50 MCG/0.5ML SUSR injection Inject 0.5 mLs into the muscle See Admin Instructions 1 dose now and repeat in 2-6 months Yes Anny Reeves MD   levothyroxine (SYNTHROID) 175 MCG tablet Take 1 tablet by mouth daily Yes Anny Reeves MD   PARoxetine (PAXIL) 40 MG tablet Take 1 tablet by mouth every morning Yes Anny Reeves MD   albuterol sulfate (PROAIR RESPICLICK) 771 (90 Base) MCG/ACT aerosol powder inhalation Inhale 2 puffs into the lungs every 6 hours as needed for Wheezing Yes Anny Reeves MD   SITagliptin [facility]) 100 MG tablet Take 1 tablet by mouth daily Yes Damion Nguyen MD   ketoconazole (NIZORAL) 2 % cream Apply topically daily. Mckenna Edmond DO   naproxen sodium (ALEVE) 220 MG tablet Take 220 mg by mouth 2 times daily as needed for Pain  Historical Provider, MD   acetaminophen (TYLENOL) 325 MG tablet Take 650 mg by mouth every 8 hours as needed for Pain  Historical Provider, MD   triamcinolone (KENALOG) 0.1 % cream Apply topically 2 times daily. Mix a small amount of both creams together during application on affected area. Ana Cruz MD   Multiple Vitamins-Minerals (EYE VITAMINS) CAPS Take by mouth  Historical Provider, MD   Multiple Vitamins-Minerals (WOMENS MULTIVITAMIN) TABS Take by mouth  Historical Provider, MD   Calcium Carb-Cholecalciferol (CALCIUM/VITAMIN D) 600-400 MG-UNIT TABS Take by mouth daily   Historical Provider, MD   Cranberry 1000 MG CAPS Take by mouth  Historical Provider, MD       Social History     Tobacco Use    Smoking status: Never Smoker    Smokeless tobacco: Never Used   Substance Use Topics    Alcohol use: Yes     Comment: occasional    Drug use: Never        Allergies   Allergen Reactions    Meperidine     Metformin      Causes severe nausea and vomiting.  Pcn [Penicillins]     Sulfa Antibiotics     Sulfamethoxazole-Trimethoprim        PHYSICAL EXAMINATION:  [ INSTRUCTIONS:  \"[x]\" Indicates a positive item  \"[]\" Indicates a negative item  -- DELETE ALL ITEMS NOT EXAMINED]    Constitutional: [x] Appears well-developed and well-nourished [x] No apparent distress      [] Abnormal-   Mental status  [x] Alert and awake  [] Oriented to person/place/time [x]Able to follow commands      Eyes:  EOM    [x]  Normal  [] Abnormal-  Sclera  [x]  Normal  [] Abnormal -         Discharge [x]  None visible  [] Abnormal -    HENT:   [x] Normocephalic, atraumatic.   [] Abnormal     External Ears [x] Normal  [] Abnormal-     Neck: [x] No visualized mass     Pulmonary/Chest: [x] Respiratory effort normal.  [x] No visualized signs of difficulty breathing or respiratory distress        [] Abnormal-     Neurological:        [x] No Facial Asymmetry (Cranial nerve 7 motor function) (limited exam to video visit)             Skin:        [x] No significant exanthematous lesions or discoloration noted on facial skin         [] Abnormal-            Psychiatric:       [x] Normal Affect [] No Hallucinations        [] Abnormal-     Other pertinent observable physical exam findings-     ASSESSMENT/PLAN:  1. Type 2 diabetes mellitus without complication, without long-term current use of insulin (HCC)  -HbA1C increased to 8.4 - would like to see that around 8.  -Patient allergic to Metformin - will start Januvia and re-evaluate A1C in 3 months  - SITagliptin (JANUVIA) 100 MG tablet; Take 1 tablet by mouth daily  Dispense: 120 tablet; Refill: 0    2. Hypothyroidism, unspecified type  -Most recent TSH stable 2.070 on 175 mcg Synthroid --> continue  - levothyroxine (SYNTHROID) 175 MCG tablet; Take 1 tablet by mouth daily  Dispense: 60 tablet; Refill: 2    3. Depression, unspecified depression type  -Doing well --> continue Paxil  - PARoxetine (PAXIL) 40 MG tablet; Take 1 tablet by mouth every morning  Dispense: 90 tablet; Refill: 1    4. Candidal intertrigo  -Improving    5. Need for shingles vaccine  -Order sent to pharmacy  - zoster recombinant adjuvanted vaccine [facility]) 50 MCG/0.5ML SUSR injection; Inject 0.5 mLs into the muscle See Admin Instructions 1 dose now and repeat in 2-6 months  Dispense: 0.5 mL; Refill: 0      Return in about 3 months (around 1/15/2021). Moo Rodriguez is a 80 y.o. female being evaluated by a Virtual Visit (video visit) encounter to address concerns as mentioned above. A caregiver was present when appropriate.  Due to this being a TeleHealth encounter (During [facility]-36 public health emergency), evaluation of the following organ systems was limited: Vitals/Constitutional/EENT/Resp/CV/GI//MS/Neuro/Skin/Heme-Lymph-Imm. Pursuant to the emergency declaration under the [address] [place] [address] and the [employer] Act, this Virtual Visit was conducted with patient's (and/or legal guardian's) consent, to reduce the patient's risk of exposure to COVID-19 and provide necessary medical care. The patient (and/or legal guardian) has also been advised to contact this office for worsening conditions or problems, and seek emergency medical treatment and/or call 911 if deemed necessary. Patient identification was verified at the start of the visit: Yes    Total time spent on this encounter: 15    Services were provided through a video synchronous discussion virtually to substitute for in-person clinic visit. Patient and provider were located at their individual homes. Attending attestation:  I personally performed and participated key or critical portions of the evaluation and management including personally performing the exam and medical decision making. I verify the accuracy of the documentation by the resident with the following addition or changes:  Largely stable today. New A1C elevation so will start Januvia. Re-check in 3-4 months. Amenable to shingrix. Electronically signed by Maximiliano Suárez MD on 10/15/2020 at 4:28 PM      --Maximiliano Suárez MD on 10/15/2020 at 4:28 PM    An electronic signature was used to authenticate this note.

## 2020-10-15 NOTE — TELEPHONE ENCOUNTER
Please let nursing home know of new med start [facility]. Send a copy of note. Let patient know we are starting this instead of metformin since old GI upset noted on chart with metformin. Also due for shingrix shot; sent rx to pharmacy.

## 2020-10-19 NOTE — TELEPHONE ENCOUNTER
Not sure how to write order for nursing home in [address] [place] [address] Rd. Usually they fax over order and I sign. Okay to give verbal order for Januvia 100 mg daily; dispense 90 with 3 refills. Okay to schedule in office visit at some point for shingles shot or can fill rx at pharmacy and have nursing home administer if that is an option.

## 2020-10-20 NOTE — TELEPHONE ENCOUNTER
When I called the nursing home to check on this the  stated that the nurse coordinator had left for the day and there was no one to speak with the patient. The  stated she would get a message to the nurse but was unsure if she would get a hold of her yesterday. She would have the nurse call back as soon as she could.

## 2020-10-20 NOTE — TELEPHONE ENCOUNTER
What was status on patient UA symptoms? I looked at phone note but do not see an answer on this. Thanks.

## 2020-10-21 NOTE — TELEPHONE ENCOUNTER
Aware. Okay to close encounter if nursing home has orders for Januvia and a plan for shingles vaccine. Thanks.

## 2020-10-21 NOTE — TELEPHONE ENCOUNTER
Spoke with [facility] at [facility]  and was advised that the patient Is asymptomatic.

## 2020-10-29 RX ORDER — LEVOTHYROXINE SODIUM 175 UG/1
175 TABLET ORAL DAILY
Qty: 60 TABLET | Refills: 2 | Status: CANCELLED | OUTPATIENT
Start: 2020-10-29

## 2020-10-30 RX ORDER — LEVOTHYROXINE SODIUM 175 UG/1
TABLET ORAL
Qty: 90 TABLET | Refills: 1 | Status: SHIPPED | OUTPATIENT
Start: 2020-10-30

## 2020-11-02 ENCOUNTER — TELEPHONE (OUTPATIENT)
Dept: FAMILY MEDICINE CLINIC | Age: 85
End: 2020-11-02

## 2020-11-02 NOTE — TELEPHONE ENCOUNTER
Please have [facility] D/C Januvia. Will re-check A1C in 3 months. Have patient limit dietary carbs. Schedule follow-up for 3 months out.

## 2020-11-02 NOTE — TELEPHONE ENCOUNTER
[facility] after call from patient daughter over the weekend. Please confirm they were able to  script. Thanks.

## 2020-11-02 NOTE — TELEPHONE ENCOUNTER
Spoke with Raysa Lane at [facility]- yes the synthroid was picked up over the weekend

## 2020-11-19 LAB
BASOPHILS ABSOLUTE: NORMAL
BASOPHILS RELATIVE PERCENT: NORMAL
BUN BLDV-MCNC: 25 MG/DL
CALCIUM SERPL-MCNC: NORMAL MG/DL
CHLORIDE BLD-SCNC: 101 MMOL/L
CO2: 21 MMOL/L
CREAT SERPL-MCNC: 0.71 MG/DL
EOSINOPHILS ABSOLUTE: NORMAL
EOSINOPHILS RELATIVE PERCENT: NORMAL
GFR CALCULATED: NORMAL
GLUCOSE BLD-MCNC: NORMAL MG/DL
HCT VFR BLD CALC: 38 % (ref 36–46)
HEMOGLOBIN: 12.8 G/DL (ref 12–16)
INR BLD: 1
LYMPHOCYTES ABSOLUTE: NORMAL
LYMPHOCYTES RELATIVE PERCENT: NORMAL
MCH RBC QN AUTO: NORMAL PG
MCHC RBC AUTO-ENTMCNC: NORMAL G/DL
MCV RBC AUTO: NORMAL FL
MONOCYTES ABSOLUTE: NORMAL
MONOCYTES RELATIVE PERCENT: NORMAL
NEUTROPHILS ABSOLUTE: NORMAL
NEUTROPHILS RELATIVE PERCENT: NORMAL
PDW BLD-RTO: NORMAL %
PLATELET # BLD: 195 K/ΜL
PMV BLD AUTO: NORMAL FL
POTASSIUM SERPL-SCNC: 4.1 MMOL/L
PROTIME: 11 SECONDS
RBC # BLD: NORMAL 10*6/UL
SODIUM BLD-SCNC: 135 MMOL/L
WBC # BLD: 11.9 10^3/ML

## 2021-01-23 ENCOUNTER — TELEPHONE (OUTPATIENT)
Dept: FAMILY MEDICINE CLINIC | Age: 86
End: 2021-01-23

## 2021-01-23 DIAGNOSIS — F32.A DEPRESSION, UNSPECIFIED DEPRESSION TYPE: ICD-10-CM

## 2021-01-25 RX ORDER — PAROXETINE HYDROCHLORIDE 40 MG/1
TABLET, FILM COATED ORAL
Qty: 90 TABLET | Refills: 0 | Status: SHIPPED | OUTPATIENT
Start: 2021-01-25 | End: 2021-04-23

## 2021-01-25 NOTE — TELEPHONE ENCOUNTER
Please schedule for follow-up appointment sometime prior to when I go out on maternity leave in April. Thanks.

## 2021-01-26 NOTE — TELEPHONE ENCOUNTER
Attempt made to reach [facility] to schedule VV, message was left for nurse to call office back as she was busy assisting another resident.

## 2021-01-27 NOTE — TELEPHONE ENCOUNTER
Dr. Florentin Davidson is following patient he will send in new RX she will not need a VV at this time.

## 2021-04-23 DIAGNOSIS — F32.A DEPRESSION, UNSPECIFIED DEPRESSION TYPE: ICD-10-CM

## 2021-04-23 RX ORDER — PAROXETINE HYDROCHLORIDE 40 MG/1
TABLET, FILM COATED ORAL
Qty: 90 TABLET | Refills: 3 | Status: SHIPPED | OUTPATIENT
Start: 2021-04-23

## 2021-04-23 NOTE — TELEPHONE ENCOUNTER
Patient's last appointment was : 10/15/2020  Patient's next appointment is : Visit date not found  Last refilled:  1/25/21

## 2022-01-13 ENCOUNTER — PATIENT MESSAGE (OUTPATIENT)
Dept: FAMILY MEDICINE CLINIC | Age: 87
End: 2022-01-13

## 2022-01-13 NOTE — TELEPHONE ENCOUNTER
From: Davis Echavarria  To: Dr. Ajay Castro: 1/13/2022 10:32 AM EST  Subject: UTI symptoms/negative test    Hi - This is Corinne Reilly, Lynda Dubois' daughter. Lynda Dubois is at [facility], under the care of another doctor. She has symptoms similar to those of past UTIs, primarily change in mental status: delirium, anxiety, depression. Most recent test (culture) came back negative. But she is suffering terribly, with nothing to really calm her desperate fear except Xanax, which puts her at risk for increased dementia. Last month she had a fall which may have been partly due to Xanax. My concern is that the UTI test may have been a false negative. To me it feels like the situation is dire enough to warrant treating her with Keflex (effective in the past) to see if she improves. I and [facility] staff are feeling pretty helpless. Please share your thoughts. I am planning to ask the other doctor if he will try this, but I wanted to consult you, too. I suspect he will be hard to convince. Thank you!